# Patient Record
Sex: MALE | Race: WHITE | Employment: FULL TIME
[De-identification: names, ages, dates, MRNs, and addresses within clinical notes are randomized per-mention and may not be internally consistent; named-entity substitution may affect disease eponyms.]

---

## 2021-01-12 ENCOUNTER — NURSE TRIAGE (OUTPATIENT)
Dept: OTHER | Facility: CLINIC | Age: 31
End: 2021-01-12

## 2021-01-12 NOTE — TELEPHONE ENCOUNTER
Reason for Disposition   Symptoms of anxiety or panic and has not been evaluated for this by physician    Answer Assessment - Initial Assessment Questions  1. CONCERN: \"What happened that made you call today? \"      Called off work today related to anxiety. 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 2. ANXIETY SYMPTOM SCREENING: \"Can you describe how you have been feeling? \"  (e.g., tense, restless, panicky, anxious, keyed up, trouble sleeping, trouble concentrating)      Trouble concentrating, Restless, Trouble sleeping    3. ONSET: \"How long have you been feeling this way? \"      Has been anxious for a while but has also been able to manage it. Patient reports x1 week ago he started feeling unable to control it    4. RECURRENT: \"Have you felt this way before? \"  If yes: \"What happened that time? \" \"What helped these feelings go away in the past?\"       Denies being seen by doctor in the past for anxiety. Reports he has always been able self-manage symptoms    5. RISK OF HARM - SUICIDAL IDEATION:  \"Do you ever have thoughts of hurting or killing yourself? \"  (e.g., yes, no, no but preoccupation with thoughts about death)    - INTENT:  \"Do you have thoughts of hurting or killing yourself right NOW? \" (e.g., yes, no, N/A)    - PLAN: \"Do you have a specific plan for how you would do this? \" (e.g., gun, knife, overdose, no plan, N/A)      Denies SI    6. RISK OF HARM - HOMICIDAL IDEATION:  \"Do you ever have thoughts of hurting or killing someone else? \"  (e.g., yes, no, no but preoccupation with thoughts about death)    - INTENT:  \"Do you have thoughts of hurting or killing someone right NOW? \" (e.g., yes, no, N/A)    - PLAN: \"Do you have a specific plan for how you would do this? \" (e.g., gun, knife, no plan, N/A)       Denies HI    7. FUNCTIONAL IMPAIRMENT: \"How have things been going for you overall in your life?  Have you had any more difficulties than usual doing your normal daily activities? \"  (e.g., better, same, worse; self-care, school, work, interactions)      Patient has called out of work due to anxiety. Reports he has had issues eating unhealthy and has been trying to improve diet recently. 8. SUPPORT: \"Who is with you now? \" \"Who do you live with?\" \"Do you have family or friends nearby who you can talk to?\"        Lives with significant other who is his support system        9. THERAPIST: \"Do you have a counselor or therapist? Name? \"     Denies        10. STRESSORS: \"Has there been any new stress or recent changes in your life? \"        Yes, reports constant stress that has worsened recently    11. CAFFEINE ABUSE: \"Do you drink caffeinated beverages, and how much each day? \" (e.g., coffee, tea, rodrick)      Cut out caffeine over 1 month ago to try and help with anxiety           12. SUBSTANCE ABUSE: \"Do you use any illegal drugs or alcohol? \"        Denies any illegal use- Reports he is prescribed adderall and has quit taking it to try  and decrease anxiety    13. OTHER SYMPTOMS: \"Do you have any other physical symptoms right now? \" (e.g., chest pain, palpitations, difficulty breathing, fever)       Denies          14. PREGNANCY: \"Is there any chance you are pregnant? \" \"When was your last menstrual period? \"        n/a    Protocols used: ANXIETY AND PANIC ATTACK-ADULT-[place]    Patient called Antionette with pre-service center [facility] with red flag complaint. Brief description of triage: See above note    Triage indicates for patient to: See disposition    Care advice provided, patient verbalizes understanding; denies any other questions or concerns; instructed to call back for any new or worsening symptoms. Writer provided warm transfer to [facility] for appointment scheduling. Attention Provider: Thank you for allowing me to participate in the care of your patient.   The patient was connected to triage in response to information provided to the E[facility].  Please do not respond through this encounter as the response is not directed to a shared pool.

## 2021-01-13 ENCOUNTER — HOSPITAL ENCOUNTER (OUTPATIENT)
Age: 31
Setting detail: SPECIMEN
Discharge: HOME | End: 2021-01-13
Payer: COMMERCIAL

## 2021-01-13 ENCOUNTER — OFFICE VISIT (OUTPATIENT)
Dept: PRIMARY CARE CLINIC | Age: 31
End: 2021-01-13
Payer: COMMERCIAL

## 2021-01-13 VITALS
HEIGHT: 74 IN | HEART RATE: 91 BPM | WEIGHT: 232.9 LBS | TEMPERATURE: 98.7 F | OXYGEN SATURATION: 96 % | SYSTOLIC BLOOD PRESSURE: 112 MMHG | BODY MASS INDEX: 29.89 KG/M2 | DIASTOLIC BLOOD PRESSURE: 70 MMHG

## 2021-01-13 DIAGNOSIS — F32.1 MODERATE MAJOR DEPRESSION, SINGLE EPISODE (HCC): ICD-10-CM

## 2021-01-13 DIAGNOSIS — F41.9 ANXIETY: ICD-10-CM

## 2021-01-13 DIAGNOSIS — Z13.1 SCREENING FOR DIABETES MELLITUS: ICD-10-CM

## 2021-01-13 DIAGNOSIS — F32.1 MODERATE MAJOR DEPRESSION, SINGLE EPISODE (HCC): Primary | ICD-10-CM

## 2021-01-13 DIAGNOSIS — Z13.220 SCREENING FOR HYPERLIPIDEMIA: ICD-10-CM

## 2021-01-13 DIAGNOSIS — Z13.31 POSITIVE DEPRESSION SCREENING: ICD-10-CM

## 2021-01-13 LAB
ABSOLUTE EOS #: <0.03 K/UL (ref 0–0.44)
ABSOLUTE IMMATURE GRANULOCYTE: <0.03 K/UL (ref 0–0.3)
ABSOLUTE LYMPH #: 2.29 K/UL (ref 1.1–3.7)
ABSOLUTE MONO #: 0.64 K/UL (ref 0.1–1.2)
ALBUMIN SERPL-MCNC: 4.6 G/DL (ref 3.5–5.2)
ALBUMIN/GLOBULIN RATIO: 1.6 (ref 1–2.5)
ALP BLD-CCNC: 64 U/L (ref 40–129)
ALT SERPL-CCNC: 13 U/L (ref 5–41)
ANION GAP SERPL CALCULATED.3IONS-SCNC: 12 MMOL/L (ref 9–17)
AST SERPL-CCNC: 15 U/L
BASOPHILS # BLD: 1 % (ref 0–2)
BASOPHILS ABSOLUTE: 0.04 K/UL (ref 0–0.2)
BILIRUB SERPL-MCNC: 0.38 MG/DL (ref 0.3–1.2)
BUN BLDV-MCNC: 15 MG/DL (ref 6–20)
BUN/CREAT BLD: ABNORMAL (ref 9–20)
CALCIUM SERPL-MCNC: 9.6 MG/DL (ref 8.6–10.4)
CHLORIDE BLD-SCNC: 101 MMOL/L (ref 98–107)
CHOLESTEROL/HDL RATIO: 3.6
CHOLESTEROL: 160 MG/DL
CO2: 27 MMOL/L (ref 20–31)
CREAT SERPL-MCNC: 0.95 MG/DL (ref 0.7–1.2)
DIFFERENTIAL TYPE: ABNORMAL
EOSINOPHILS RELATIVE PERCENT: 0 % (ref 1–4)
GFR AFRICAN AMERICAN: >60 ML/MIN
GFR NON-AFRICAN AMERICAN: >60 ML/MIN
GFR SERPL CREATININE-BSD FRML MDRD: ABNORMAL ML/MIN/{1.73_M2}
GFR SERPL CREATININE-BSD FRML MDRD: ABNORMAL ML/MIN/{1.73_M2}
GLUCOSE FASTING: 101 MG/DL (ref 70–99)
HCT VFR BLD CALC: 50.3 % (ref 40.7–50.3)
HDLC SERPL-MCNC: 44 MG/DL
HEMOGLOBIN: 16.3 G/DL (ref 13–17)
IMMATURE GRANULOCYTES: 0 %
LDL CHOLESTEROL: 98 MG/DL (ref 0–130)
LYMPHOCYTES # BLD: 35 % (ref 24–43)
MCH RBC QN AUTO: 28.9 PG (ref 25.2–33.5)
MCHC RBC AUTO-ENTMCNC: 32.4 G/DL (ref 28.4–34.8)
MCV RBC AUTO: 89.2 FL (ref 82.6–102.9)
MONOCYTES # BLD: 10 % (ref 3–12)
NRBC AUTOMATED: 0 PER 100 WBC
PDW BLD-RTO: 13 % (ref 11.8–14.4)
PLATELET # BLD: 323 K/UL (ref 138–453)
PLATELET ESTIMATE: ABNORMAL
PMV BLD AUTO: 9.9 FL (ref 8.1–13.5)
POTASSIUM SERPL-SCNC: 4.2 MMOL/L (ref 3.7–5.3)
RBC # BLD: 5.64 M/UL (ref 4.21–5.77)
RBC # BLD: ABNORMAL 10*6/UL
SEG NEUTROPHILS: 54 % (ref 36–65)
SEGMENTED NEUTROPHILS ABSOLUTE COUNT: 3.47 K/UL (ref 1.5–8.1)
SODIUM BLD-SCNC: 140 MMOL/L (ref 135–144)
TOTAL PROTEIN: 7.5 G/DL (ref 6.4–8.3)
TRIGL SERPL-MCNC: 89 MG/DL
TSH SERPL DL<=0.05 MIU/L-ACNC: 3.76 MIU/L (ref 0.3–5)
VITAMIN D 25-HYDROXY: 15.8 NG/ML (ref 30–100)
VLDLC SERPL CALC-MCNC: NORMAL MG/DL (ref 1–30)
WBC # BLD: 6.5 K/UL (ref 3.5–11.3)
WBC # BLD: ABNORMAL 10*3/UL

## 2021-01-13 PROCEDURE — 99214 OFFICE O/P EST MOD 30 MIN: CPT | Performed by: NURSE PRACTITIONER

## 2021-01-13 PROCEDURE — G8431 POS CLIN DEPRES SCRN F/U DOC: HCPCS | Performed by: NURSE PRACTITIONER

## 2021-01-13 RX ORDER — BUPROPION HYDROCHLORIDE 150 MG/1
150 TABLET ORAL EVERY MORNING
Qty: 30 TABLET | Refills: 0 | Status: SHIPPED | OUTPATIENT
Start: 2021-01-13 | End: 2021-02-03

## 2021-01-13 RX ORDER — HYDROXYZINE HYDROCHLORIDE 25 MG/1
25 TABLET, FILM COATED ORAL EVERY 8 HOURS PRN
Qty: 30 TABLET | Refills: 0 | Status: SHIPPED
Start: 2021-01-13 | End: 2021-01-20 | Stop reason: SINTOL

## 2021-01-13 ASSESSMENT — PATIENT HEALTH QUESTIONNAIRE - PHQ9
4. FEELING TIRED OR HAVING LITTLE ENERGY: 0
5. POOR APPETITE OR OVEREATING: 2
SUM OF ALL RESPONSES TO PHQ9 QUESTIONS 1 & 2: 6
1. LITTLE INTEREST OR PLEASURE IN DOING THINGS: 3
2. FEELING DOWN, DEPRESSED OR HOPELESS: 3
SUM OF ALL RESPONSES TO PHQ QUESTIONS 1-9: 14

## 2021-01-13 ASSESSMENT — COLUMBIA-SUICIDE SEVERITY RATING SCALE - C-SSRS: 6. HAVE YOU EVER DONE ANYTHING, STARTED TO DO ANYTHING, OR PREPARED TO DO ANYTHING TO END YOUR LIFE?: NO

## 2021-01-13 ASSESSMENT — ENCOUNTER SYMPTOMS
ALLERGIC/IMMUNOLOGIC NEGATIVE: 1
GASTROINTESTINAL NEGATIVE: 1
EYES NEGATIVE: 1
RESPIRATORY NEGATIVE: 1

## 2021-01-13 NOTE — LETTER
[address] [facility]  [address]  [place] [address]  Phone: [phone]  Fax: [phone] [facility], APRN - [place]        January 13, 2021     Patient: Frankie Ying   YOB: 1990   Date of Visit: 1/13/2021       To Whom It May Concern: It is my medical opinion that Timur Landin should remain off work from 1/11/2021 through 1/20/2021. If you have any questions or concerns, please don't hesitate to call.     Sincerely,        Robles Taylor, FLAKO - CNP

## 2021-01-13 NOTE — PATIENT INSTRUCTIONS
Patient Education        Learning About Anxiety Disorders  What are anxiety disorders? Anxiety disorders are a type of medical problem. They cause severe anxiety. When you feel anxious, you feel that something bad is about to happen. This feeling interferes with your life. These disorders include:  · Generalized anxiety disorder. You feel worried and stressed about many everyday events and activities. This goes on for several months and disrupts your life on most days. · Panic disorder. You have repeated panic attacks. A panic attack is a sudden, intense fear or anxiety. It may make you feel short of breath. Your heart may pound. · Social anxiety disorder. You feel very anxious about what you will say or do in front of people. For example, you may be scared to talk or eat in public. This problem affects your daily life. · Phobias. You are very scared of a specific object, situation, or activity. For example, you may fear spiders, high places, or small spaces. What are the symptoms? Generalized anxiety disorder  Symptoms may include:  · Feeling worried and stressed about many things almost every day. · Feeling tired or irritable. You may have a hard time concentrating. · Having headaches or muscle aches. · Having a hard time getting to sleep or staying asleep. Panic disorder  You may have repeated panic attacks when there is no reason for feeling afraid. You may change your daily activities because you worry that you will have another attack. Symptoms may include:  · Intense fear, terror, or anxiety. · Trouble breathing or very fast breathing. · Chest pain or tightness. · A heartbeat that races or is not regular. Social anxiety disorder  Symptoms may include:  · Fear about a social situation, such as eating in front of others or speaking in public. You may worry a lot. Or you may be afraid that something bad will happen. · Anxiety that can cause you to blush, sweat, and feel shaky.   · A heartbeat that is faster than normal.  · A hard time focusing. Phobias  Symptoms may include:  · More fear than most people of being around an object, being in a situation, or doing an activity. You might also be stressed about the chance of being around the thing you fear. · Worry about losing control, panicking, fainting, or having physical symptoms like a faster heartbeat when you are around the situation or object. How are these disorders treated? Anxiety disorders can be treated with medicines or counseling. A combination of both may be used. Medicines may include:  · Antidepressants. These may help your symptoms by keeping chemicals in your brain in balance. · Benzodiazepines. These may give you short-term relief of your symptoms. Some people use cognitive-behavioral therapy. A therapist helps you learn to change stressful or bad thoughts into helpful thoughts. Lead a healthy lifestyle  A healthy lifestyle may help you feel better. · Get at least 30 minutes of exercise on most days of the week. Walking is a good choice. · Eat a healthy diet. Include fruits, vegetables, lean proteins, and whole grains in your diet each day. · Try to go to bed at the same time every night. Try for 8 hours of sleep a night. · Find ways to manage stress. Try relaxation exercises. · Avoid alcohol and illegal drugs. Follow-up care is a key part of your treatment and safety. Be sure to make and go to all appointments, and call your doctor if you are having problems. It's also a good idea to know your test results and keep a list of the medicines you take. Where can you learn more? Go to https://geronimo.Fidelis. org and sign in to your [employer] account. Enter S273 in the [facility] box to learn more about \"Learning About Anxiety Disorders. \"     If you do not have an account, please click on the \"Sign Up Now\" link.   Current as of: January 31, 2020               Content Version: 12.6  © 7120-5037 Healthwise, Incorporated. Care instructions adapted under license by [facility] ([facility]). If you have questions about a medical condition or this instruction, always ask your healthcare professional. [address] any warranty or liability for your use of this information. Patient Education        Depression Treatment: Care Instructions  Your Care Instructions     Depression is a condition that affects the way you feel, think, and act. It causes symptoms such as low energy, loss of interest in daily activities, and sadness or grouchiness that goes on for a long time. Depression is very common and affects men and women of all ages. Depression is a medical illness caused by changes in the natural chemicals in your brain. It is not a character flaw, and it does not mean that you are a bad or weak person. It does not mean that you are going crazy. It is important to know that depression can be treated. Medicines, counseling, and self-care can all help. Many people do not get help because they are embarrassed or think that they will get over the depression on their own. But some people do not get better without treatment. Follow-up care is a key part of your treatment and safety. Be sure to make and go to all appointments, and call your doctor if you are having problems. It's also a good idea to know your test results and keep a list of the medicines you take. How can you care for yourself at home? Learn about antidepressant medicines  Antidepressant medicines can improve or end the symptoms of depression. You may need to take the medicine for at least 6 months, and often longer. Keep taking your medicine even if you feel better. If you stop taking it too soon, your symptoms may come back or get worse. You may start to feel better within 1 to 3 weeks of taking antidepressant medicine. But it can take as many as 6 to 8 weeks to see more improvement.  Talk to your doctor if you have problems with your medicine or if you do not notice any improvement after 3 weeks. Antidepressants can make you feel tired, dizzy, or nervous. Some people have dry mouth, constipation, headaches, sexual problems, an upset stomach, or diarrhea. Many of these side effects are mild and go away on their own after you take the medicine for a few weeks. Some may last longer. Talk to your doctor if side effects bother you too much. You might be able to try a different medicine. If you are pregnant or breastfeeding, talk to your doctor about what medicines you can take. Learn about counseling  In many cases, counseling can work as well as medicines to treat mild to moderate depression. Counseling is done by licensed mental health providers, such as psychologists, social workers, and some types of nurses. It can be done in one-on-one sessions or in a group setting. Many people find group sessions helpful. Cognitive-behavioral therapy is a type of counseling. In this treatment therapy, you learn how to see and change unhelpful thinking styles that may be adding to your depression. Counseling and medicines often work well when used together. Here are other things you could try to help with depression:  · Get regular exercise. It may help you feel better. · Plan something pleasant for yourself every day. Include activities that you have enjoyed in the past.  · Get enough sleep. Talk to your doctor if you have problems sleeping. · Eat a balanced diet. If you do not feel hungry, eat small snacks rather than large meals. · Avoid using illegal drugs or marijuana and drinking alcohol. Do not take medicines that have not been prescribed for you. They may interfere with your treatment, or they may make your depression worse. · Spend time with family and friends. It may help to speak openly about your depression with people you trust.  · Take your medicines exactly as prescribed.  Call your doctor if you think you are having a problem with your medicine. · Do not make major life decisions while you are depressed. Depression may change the way you think. You will be able to make better decisions after you feel better. · Think positively. Challenge negative thoughts with statements such as \"I am hopeful\"; \"Things will get better\"; and \"I can ask for the help I need. \" Write down these statements and read them often, even if you don't believe them yet. · Be patient with yourself. It took time for your depression to develop, and it will take time for your symptoms to improve. Do not take on too much or be too hard on yourself. · Learn all you can about depression from written and online materials. · Check out behavioral health classes to learn more about dealing with depression. · If you or someone you know talks about suicide, self-harm, or feeling hopeless, get help right away. Call the [address] [place] [address] at 9-557-083-HSRS (2-360.225.7477) or text HOME to 171407 to access the [email or web address] Text Line. Consider saving these numbers in your phone. When should you call for help? Call 911 anytime you think you may need emergency care. For example, call if:    · You feel you cannot stop from hurting yourself or someone else. Call your doctor now or seek immediate medical care if:    · You hear voices.     · You feel much more depressed. Watch closely for changes in your health, and be sure to contact your doctor if:    · You are having problems with your depression medicine.     · You are not getting better as expected. Where can you learn more? Go to https://HitFixopal.Video Passports. org and sign in to your [employer] account. Enter M341 in the [facility] box to learn more about \"Depression Treatment: Care Instructions. \"     If you do not have an account, please click on the \"Sign Up Now\" link. Current as of: January 31, 2020               Content Version: 12.6  © 8245-9243 [employer], [facility].    Care instructions adapted under license by [facility] ([facility]). If you have questions about a medical condition or this instruction, always ask your healthcare professional. [address] any warranty or liability for your use of this information.

## 2021-01-17 ENCOUNTER — PATIENT MESSAGE (OUTPATIENT)
Dept: PRIMARY CARE CLINIC | Age: 31
End: 2021-01-17

## 2021-01-18 NOTE — TELEPHONE ENCOUNTER
From: Jasen Chaudhry Lee  To: FLAKO Powers - CNP  Sent: 1/17/2021 3:09 PM EST  Subject: Non-Urgent Medical Question    Hey its Vedia Pastswati, I was just curious if you got around to filling out the fmla paper work that was sent to the office. [employer] the company said they sent it Thursday.  Just checking in with that, thank you

## 2021-01-20 ENCOUNTER — OFFICE VISIT (OUTPATIENT)
Dept: PRIMARY CARE CLINIC | Age: 31
End: 2021-01-20
Payer: COMMERCIAL

## 2021-01-20 VITALS
DIASTOLIC BLOOD PRESSURE: 80 MMHG | HEART RATE: 100 BPM | SYSTOLIC BLOOD PRESSURE: 128 MMHG | OXYGEN SATURATION: 96 % | HEIGHT: 74 IN | WEIGHT: 230 LBS | TEMPERATURE: 98.3 F | BODY MASS INDEX: 29.52 KG/M2

## 2021-01-20 DIAGNOSIS — F51.01 PRIMARY INSOMNIA: ICD-10-CM

## 2021-01-20 DIAGNOSIS — F32.1 MODERATE MAJOR DEPRESSION, SINGLE EPISODE (HCC): Primary | ICD-10-CM

## 2021-01-20 DIAGNOSIS — F41.9 ANXIETY: ICD-10-CM

## 2021-01-20 DIAGNOSIS — E55.9 HYPOVITAMINOSIS D: ICD-10-CM

## 2021-01-20 PROCEDURE — 99214 OFFICE O/P EST MOD 30 MIN: CPT | Performed by: NURSE PRACTITIONER

## 2021-01-20 RX ORDER — TRAZODONE HYDROCHLORIDE 50 MG/1
50 TABLET ORAL NIGHTLY PRN
Qty: 30 TABLET | Refills: 0 | Status: SHIPPED | OUTPATIENT
Start: 2021-01-20 | End: 2021-01-26

## 2021-01-20 ASSESSMENT — ENCOUNTER SYMPTOMS
EYES NEGATIVE: 1
RESPIRATORY NEGATIVE: 1
ALLERGIC/IMMUNOLOGIC NEGATIVE: 1
GASTROINTESTINAL NEGATIVE: 1

## 2021-01-20 NOTE — PROGRESS NOTES
Quit date: 2016     Years since quittin.9    Smokeless tobacco: Former User   Substance Use Topics    Alcohol use: No    Drug use: Never      Past Medical History:   Diagnosis Date    ADD (attention deficit disorder) 9/15/2016    Anxiety       No data recorded   Immunization:  Immunization History   Administered Date(s) Administered    Tdap (Boostrix, Adacel) 2008    Varicella (Varivax) 1990, 1993     Past Surgical History:   Procedure Laterality Date    FRACTURE SURGERY Right     age 7     Family History   Problem Relation Age of Onset    Diabetes Mother     Cancer Sister 28        neck and throat cancer     Current Outpatient Medications   Medication Sig Dispense Refill    traZODone (DESYREL) 50 MG tablet Take 1 tablet by mouth nightly as needed for Sleep 30 tablet 0    buPROPion (WELLBUTRIN XL) 150 MG extended release tablet Take 1 tablet by mouth every morning 30 tablet 0     No current facility-administered medications for this visit. The patient's past medical, surgical, social, and family history as well as his current medications and allergies were reviewed as documented in today's encounter. Review of Systems   Constitutional: Positive for fatigue. HENT: Negative. Eyes: Negative. Respiratory: Negative. Cardiovascular: Negative. Gastrointestinal: Negative. Endocrine: Negative. Genitourinary: Negative. Musculoskeletal: Negative. Skin: Negative. Allergic/Immunologic: Negative. Neurological: Positive for dizziness. Hematological: Negative. Psychiatric/Behavioral: Positive for decreased concentration and sleep disturbance. The patient is nervous/anxious. All other systems reviewed and are negative. /80   Pulse 100   Temp 98.3 °F (36.8 °C) (Temporal)   Ht 6' 2\" (1.88 m)   Wt 230 lb (104.3 kg)   SpO2 96%   BMI 29.53 kg/m²   Physical Exam  Vitals signs and nursing note reviewed.    Constitutional: Appearance: Normal appearance. He is normal weight. HENT:      Right Ear: External ear normal.      Left Ear: External ear normal.      Nose: Nose normal.      Mouth/Throat:      Mouth: Mucous membranes are moist.      Pharynx: Oropharynx is clear. Eyes:      Extraocular Movements: Extraocular movements intact. Conjunctiva/sclera: Conjunctivae normal.      Pupils: Pupils are equal, round, and reactive to light. Neck:      Musculoskeletal: Normal range of motion. Cardiovascular:      Rate and Rhythm: Normal rate and regular rhythm. Pulses: Normal pulses. Heart sounds: Normal heart sounds. Pulmonary:      Effort: Pulmonary effort is normal.      Breath sounds: Normal breath sounds. Abdominal:      General: Abdomen is flat. Bowel sounds are normal.      Palpations: Abdomen is soft. Skin:     General: Skin is warm and dry. Capillary Refill: Capillary refill takes less than 2 seconds. Neurological:      General: No focal deficit present. Mental Status: He is alert. Psychiatric:         Attention and Perception: Attention normal.         Mood and Affect: Mood is anxious and depressed. Affect is tearful. Speech: Speech normal.         Behavior: Behavior is cooperative. Thought Content: Thought content is not paranoid or delusional. Thought content does not include homicidal or suicidal ideation. Thought content does not include homicidal or suicidal plan.          Cognition and Memory: Cognition and memory normal.         Judgment: Judgment normal.       Lab Results   Component Value Date    WBC 6.5 01/13/2021    HGB 16.3 01/13/2021    HCT 50.3 01/13/2021    MCV 89.2 01/13/2021     01/13/2021     Lab Results   Component Value Date     01/13/2021    K 4.2 01/13/2021     01/13/2021    CO2 27 01/13/2021    BUN 15 01/13/2021    CREATININE 0.95 01/13/2021    GLUCOSE 108 02/22/2014    CALCIUM 9.6 01/13/2021      Lab Results   Component Value Date    ALT 13 01/13/2021    AST 15 01/13/2021    ALKPHOS 64 01/13/2021    BILITOT 0.38 01/13/2021     Lab Results   Component Value Date    TSH 3.76 01/13/2021     Lab Results   Component Value Date    CHOL 160 01/13/2021     Lab Results   Component Value Date    TRIG 89 01/13/2021     Lab Results   Component Value Date    HDL 44 01/13/2021     Lab Results   Component Value Date    LDLCHOLESTEROL 98 01/13/2021     Lab Results   Component Value Date    CHOLHDLRATIO 3.6 01/13/2021     No results found for: LABA1C  No results found for: GESHIKKM21  No results found for: FOLATE  No results found for: IRON, TIBC, FERRITIN  Lab Results   Component Value Date    VITD25 15.8 (L) 01/13/2021     I personally reviewed testing with patient. Otherwise labs within normal limits  ASSESSMENT AND PLAN  1. Moderate major depression, single episode ([address]  Continue wellbutrin and f/u in one week. 2. Anxiety  D/c hydroxyzine due to side effects. 3. Primary insomnia    - traZODone (DESYREL) 50 MG tablet; Take 1 tablet by mouth nightly as needed for Sleep  Dispense: 30 tablet; Refill: 0     No orders of the defined types were placed in this encounter. Orders Placed This Encounter   Medications    traZODone (DESYREL) 50 MG tablet     Sig: Take 1 tablet by mouth nightly as needed for Sleep     Dispense:  30 tablet     Refill:  0      Data Unavailable  There are no preventive care reminders to display for this patient. Medications Discontinued During This Encounter   Medication Reason    hydrOXYzine (ATARAX) 25 MG tablet Side effects     The patient's past medical, surgical, social, and family history as well as his   current medications and allergies were reviewed as documented in today's encounter. Medications, labs, diagnostic studies, consultations and follow-up as documented in this encounter. Return in about 1 week (around 1/27/2021).   Future Appointments   Date Time Provider David Gupta   1/27/2021 11:30 AM Jeannie Delong

## 2021-01-26 DIAGNOSIS — F51.01 PRIMARY INSOMNIA: Primary | Chronic | ICD-10-CM

## 2021-01-26 RX ORDER — ZOLPIDEM TARTRATE 10 MG/1
10 TABLET ORAL NIGHTLY PRN
Qty: 14 TABLET | Refills: 0 | Status: SHIPPED | OUTPATIENT
Start: 2021-01-26 | End: 2021-02-09

## 2021-01-26 NOTE — PROGRESS NOTES
Spoke with pt through jenny, trazodone not helping and pt still not able to sleep at night. Told pt to d/c trazodone and will send ambien to try, pt has f.u appt tomorrow, pt understands and agreeable.

## 2021-01-27 ENCOUNTER — OFFICE VISIT (OUTPATIENT)
Dept: PRIMARY CARE CLINIC | Age: 31
End: 2021-01-27
Payer: COMMERCIAL

## 2021-01-27 VITALS
HEART RATE: 92 BPM | OXYGEN SATURATION: 96 % | SYSTOLIC BLOOD PRESSURE: 132 MMHG | BODY MASS INDEX: 29.54 KG/M2 | WEIGHT: 230.2 LBS | HEIGHT: 74 IN | TEMPERATURE: 97.9 F | DIASTOLIC BLOOD PRESSURE: 80 MMHG

## 2021-01-27 DIAGNOSIS — F32.1 MODERATE MAJOR DEPRESSION, SINGLE EPISODE (HCC): Primary | Chronic | ICD-10-CM

## 2021-01-27 DIAGNOSIS — F51.01 PRIMARY INSOMNIA: Chronic | ICD-10-CM

## 2021-01-27 DIAGNOSIS — F41.9 ANXIETY: Chronic | ICD-10-CM

## 2021-01-27 PROCEDURE — 99214 OFFICE O/P EST MOD 30 MIN: CPT | Performed by: NURSE PRACTITIONER

## 2021-01-27 RX ORDER — FLUOXETINE HYDROCHLORIDE 20 MG/1
20 CAPSULE ORAL DAILY
Qty: 30 CAPSULE | Refills: 3 | Status: SHIPPED | OUTPATIENT
Start: 2021-01-27 | End: 2021-02-03

## 2021-01-27 ASSESSMENT — ENCOUNTER SYMPTOMS
EYES NEGATIVE: 1
ALLERGIC/IMMUNOLOGIC NEGATIVE: 1
GASTROINTESTINAL NEGATIVE: 1
RESPIRATORY NEGATIVE: 1

## 2021-01-27 NOTE — PROGRESS NOTES
[facility] MEDICINE  [address] [place] [id number]  Dept: [phone]       Yogesh Arora is a 31 y.o. male who presents to the office today for evaluation of Depression, Medication Check, and Insomnia    Pt here for medication check, he hasn't picked up ambien yet, will try tonight. Trazodone not working for sleep. Anxiety/depression has improved but not by a lot, discussed switching from wellbutrin to prozac instead. Told pt to start taking wellbutrin every other day for 7 days, can start prozac today. Will see how he does with ambien and prozac and f/u in one week. Pt understands and agreeable to plan, will call or message me before then if any issues. Patient Active Problem List    Diagnosis Date Noted    Primary insomnia 2021    Hypovitaminosis D 2021    [facility], [facility]    Anxiety 2021    ADD (attention deficit disorder) 09/15/2016     Patient's BMI is Body mass index is 29.56 kg/m².    Social History     Tobacco Use    Smoking status: Former Smoker     Packs/day: 0.25     Years: 2.00     Pack years: 0.50     Quit date: 2016     Years since quittin.9    Smokeless tobacco: Former User   Substance Use Topics    Alcohol use: No    Drug use: Never      Past Medical History:   Diagnosis Date    ADD (attention deficit disorder) 9/15/2016    Anxiety       No data recorded   Immunization:  Immunization History   Administered Date(s) Administered    Tdap (Boostrix, Adacel) 2008    Varicella (Varivax) 1990, 1993     Past Surgical History:   Procedure Laterality Date    FRACTURE SURGERY Right     age 7     Family History   Problem Relation Age of Onset    Diabetes Mother     Cancer Sister 28        neck and throat cancer     Current Outpatient Medications   Medication Sig Dispense Refill    FLUoxetine (PROZAC) 20 MG capsule Take 1 capsule by mouth daily 30 capsule 3  zolpidem (AMBIEN) 10 MG tablet Take 1 tablet by mouth nightly as needed for Sleep for up to 14 days. 14 tablet 0    buPROPion (WELLBUTRIN XL) 150 MG extended release tablet Take 1 tablet by mouth every morning 30 tablet 0     No current facility-administered medications for this visit. The patient's past medical, surgical, social, and family history as well as his current medications and allergies were reviewed as documented in today's encounter. Review of Systems   Constitutional: Positive for fatigue. HENT: Negative. Eyes: Negative. Respiratory: Negative. Cardiovascular: Negative. Gastrointestinal: Negative. Endocrine: Negative. Genitourinary: Negative. Musculoskeletal: Negative. Skin: Negative. Allergic/Immunologic: Negative. Neurological: Negative. Hematological: Negative. Psychiatric/Behavioral: Positive for sleep disturbance. Negative for suicidal ideas. The patient is nervous/anxious and is hyperactive. All other systems reviewed and are negative. /80 (Site: Left Upper Arm, Position: Sitting, Cuff Size: Medium Adult)   Pulse 92   Temp 97.9 °F (36.6 °C) (Temporal)   Ht 6' 2\" (1.88 m)   Wt 230 lb 3.2 oz (104.4 kg)   SpO2 96%   BMI 29.56 kg/m²   Physical Exam  Vitals signs and nursing note reviewed. Constitutional:       Appearance: Normal appearance. He is normal weight. HENT:      Right Ear: Tympanic membrane, ear canal and external ear normal.      Left Ear: Tympanic membrane, ear canal and external ear normal.      Nose: Nose normal.      Mouth/Throat:      Mouth: Mucous membranes are moist.      Pharynx: Oropharynx is clear. Eyes:      Extraocular Movements: Extraocular movements intact. Conjunctiva/sclera: Conjunctivae normal.      Pupils: Pupils are equal, round, and reactive to light. Neck:      Musculoskeletal: Normal range of motion. Cardiovascular:      Rate and Rhythm: Normal rate and regular rhythm.       Pulses: Normal pulses. Heart sounds: Normal heart sounds. Pulmonary:      Effort: Pulmonary effort is normal.      Breath sounds: Normal breath sounds. Abdominal:      General: Abdomen is flat. Bowel sounds are normal.      Palpations: Abdomen is soft. Skin:     General: Skin is warm and dry. Capillary Refill: Capillary refill takes less than 2 seconds. Neurological:      General: No focal deficit present. Mental Status: He is alert. Psychiatric:         Mood and Affect: Mood is anxious. Behavior: Behavior normal.         Thought Content: Thought content normal.         Judgment: Judgment normal.       Lab Results   Component Value Date    WBC 6.5 01/13/2021    HGB 16.3 01/13/2021    HCT 50.3 01/13/2021    MCV 89.2 01/13/2021     01/13/2021     Lab Results   Component Value Date     01/13/2021    K 4.2 01/13/2021     01/13/2021    CO2 27 01/13/2021    BUN 15 01/13/2021    CREATININE 0.95 01/13/2021    GLUCOSE 108 02/22/2014    CALCIUM 9.6 01/13/2021      Lab Results   Component Value Date    ALT 13 01/13/2021    AST 15 01/13/2021    ALKPHOS 64 01/13/2021    BILITOT 0.38 01/13/2021     Lab Results   Component Value Date    TSH 3.76 01/13/2021     Lab Results   Component Value Date    CHOL 160 01/13/2021     Lab Results   Component Value Date    TRIG 89 01/13/2021     Lab Results   Component Value Date    HDL 44 01/13/2021     Lab Results   Component Value Date    LDLCHOLESTEROL 98 01/13/2021     Lab Results   Component Value Date    CHOLHDLRATIO 3.6 01/13/2021     No results found for: LABA1C  No results found for: [id number]  No results found for: FOLATE  No results found for: IRON, TIBC, FERRITIN  Lab Results   Component Value Date    VITD25 15.8 (L) 01/13/2021     I personally reviewed testing with patient. Otherwise labs within normal limits  ASSESSMENT AND PLAN  1. Moderate major depression, single episode (HCC)    - FLUoxetine (PROZAC) 20 MG capsule;  Take 1 capsule by mouth daily  Dispense: 30 capsule; Refill: 3    2. Anxiety    - FLUoxetine (PROZAC) 20 MG capsule; Take 1 capsule by mouth daily  Dispense: 30 capsule; Refill: 3    3. Primary insomnia       No orders of the defined types were placed in this encounter. Orders Placed This Encounter   Medications    FLUoxetine (PROZAC) 20 MG capsule     Sig: Take 1 capsule by mouth daily     Dispense:  30 capsule     Refill:  3      Data Unavailable  There are no preventive care reminders to display for this patient. There are no discontinued medications. The patient's past medical, surgical, social, and family history as well as his   current medications and allergies were reviewed as documented in today's encounter. Medications, labs, diagnostic studies, consultations and follow-up as documented in this encounter. Return in about 1 week (around 2/3/2021). Future Appointments   Date Time Provider David Gupta   2/3/2021 11:30 AM FLAKO Ewing CNP     This note was completed by using the assistance of a speech-recognition program. However, inadvertent computerized transcription errors may be present. Although every effort was made to ensure accuracy, no guarantees can be provided that every mistake has been identified and corrected by editing.   Electronically signed by FLAKO Osorio CNP on 1/27/2021 at 12:14 PM

## 2021-02-03 ENCOUNTER — OFFICE VISIT (OUTPATIENT)
Dept: PRIMARY CARE CLINIC | Age: 31
End: 2021-02-03
Payer: COMMERCIAL

## 2021-02-03 VITALS
DIASTOLIC BLOOD PRESSURE: 70 MMHG | TEMPERATURE: 98 F | SYSTOLIC BLOOD PRESSURE: 120 MMHG | BODY MASS INDEX: 29.52 KG/M2 | OXYGEN SATURATION: 98 % | HEART RATE: 90 BPM | WEIGHT: 230 LBS | HEIGHT: 74 IN

## 2021-02-03 DIAGNOSIS — F41.9 ANXIETY: Chronic | ICD-10-CM

## 2021-02-03 DIAGNOSIS — F32.1 MODERATE MAJOR DEPRESSION, SINGLE EPISODE (HCC): Primary | Chronic | ICD-10-CM

## 2021-02-03 DIAGNOSIS — F90.2 ATTENTION DEFICIT HYPERACTIVITY DISORDER (ADHD), COMBINED TYPE: Chronic | ICD-10-CM

## 2021-02-03 PROCEDURE — 99214 OFFICE O/P EST MOD 30 MIN: CPT | Performed by: NURSE PRACTITIONER

## 2021-02-03 RX ORDER — TRAZODONE HYDROCHLORIDE 50 MG/1
50 TABLET ORAL NIGHTLY
COMMUNITY
End: 2021-06-08

## 2021-02-03 RX ORDER — VENLAFAXINE HYDROCHLORIDE 37.5 MG/1
37.5 CAPSULE, EXTENDED RELEASE ORAL DAILY
Qty: 30 CAPSULE | Refills: 0 | Status: SHIPPED
Start: 2021-02-03 | End: 2021-02-10 | Stop reason: SINTOL

## 2021-02-03 ASSESSMENT — ENCOUNTER SYMPTOMS
EYES NEGATIVE: 1
RESPIRATORY NEGATIVE: 1
GASTROINTESTINAL NEGATIVE: 1
ALLERGIC/IMMUNOLOGIC NEGATIVE: 1

## 2021-02-03 NOTE — LETTER
[address] [facility]  [address]  [place] [address]  Phone: [phone]  Fax: [address], [place] - [place]        February 3, 2021     Patient: Edna Thomas   YOB: 1990   Date of Visit: 2/3/2021       To Whom It May Concern: It is my medical opinion that Delmer Brian should remain out of work until from 2/3/2021 through 2/10/2021. If you have any questions or concerns, please don't hesitate to call.     Sincerely,        FLAKO Batista - CNP

## 2021-02-03 NOTE — PROGRESS NOTES
[facility]  MEDICINE  [address] [place] [id number]  Dept: [phone]       Roney Tipton is a 31 y.o. male who presents to the office today for evaluation of Anxiety (states anxiety has improved ), Depression (taking prozac for two days and had GI upset and nausea. stopped thr prozax after two days, weaned off of wellbutrin, feels a lot of brain fog, a lot of difficulty with focus and not wanting to get out of bed. ), and Insomnia (ambien unsuccessful on its own, tried trazadone as directed and felt groggy and slow the following days althugh he was sleeping with both agents )    Pt is here for f/u and med check. Pt started prozac but stopped after 2 days due to having GI side effects. He also d/c'd wellbutrin because he didn't feel it was helping. He is sleeping better and takes [place] as needed and trazodone nightly. Pt says his anxiety and depression has improved but he is still having issues with brain fog, concentration and not feeling motivated to be productive. Pt mentioned wanting to try effexor, will start low dose and have f/u in one week to reassess. Pt is still off work and at this time I don't feel he is ready to return until we get his mental state more stabilized, pt agrees and understands. Patient Active Problem List    Diagnosis Date Noted    Primary insomnia 2021    Hypovitaminosis D 2021    Moderate major depression, single episode ([facility] 2021    Anxiety 2021    ADD (attention deficit disorder) 09/15/2016     Patient's BMI is Body mass index is 29.53 kg/m².    Social History     Tobacco Use    Smoking status: Former Smoker     Packs/day: 0.25     Years: 2.00     Pack years: 0.50     Quit date: 2016     Years since quittin.9    Smokeless tobacco: Former User   Substance Use Topics    Alcohol use: No    Drug use: Never      Past Medical History:   Diagnosis Date    ADD (attention deficit disorder) 9/15/2016  Anxiety       No data recorded   Immunization:  Immunization History   Administered Date(s) Administered    Tdap (Boostrix, Adacel) 06/27/2008    Varicella (Varivax) 06/27/1990, 06/27/1993     Past Surgical History:   Procedure Laterality Date    FRACTURE SURGERY Right     age 7     Family History   Problem Relation Age of Onset    Diabetes Mother     Cancer Sister 28        neck and throat cancer     Current Outpatient Medications   Medication Sig Dispense Refill    traZODone (DESYREL) 50 MG tablet Take 50 mg by mouth nightly      venlafaxine (EFFEXOR XR) 37.5 MG extended release capsule Take 1 capsule by mouth daily 30 capsule 0    zolpidem (AMBIEN) 10 MG tablet Take 1 tablet by mouth nightly as needed for Sleep for up to 14 days. 14 tablet 0     No current facility-administered medications for this visit. The patient's past medical, surgical, social, and family history as well as his current medications and allergies were reviewed as documented in today's encounter. Review of Systems   Constitutional: Negative. HENT: Negative. Eyes: Negative. Respiratory: Negative. Cardiovascular: Negative. Gastrointestinal: Negative. Endocrine: Negative. Genitourinary: Negative. Musculoskeletal: Negative. Skin: Negative. Allergic/Immunologic: Negative. Neurological: Negative. Hematological: Negative. Psychiatric/Behavioral: Positive for decreased concentration and sleep disturbance. All other systems reviewed and are negative. /70   Pulse 90   Temp 98 °F (36.7 °C)   Ht 6' 2\" (1.88 m)   Wt 230 lb (104.3 kg)   SpO2 98%   BMI 29.53 kg/m²   Physical Exam  Vitals signs and nursing note reviewed. Constitutional:       Appearance: Normal appearance. He is normal weight. HENT:      Nose: Nose normal.      Mouth/Throat:      Mouth: Mucous membranes are moist.      Pharynx: Oropharynx is clear.    Eyes:      Extraocular Movements: Extraocular movements intact. Conjunctiva/sclera: Conjunctivae normal.      Pupils: Pupils are equal, round, and reactive to light. Neck:      Musculoskeletal: Normal range of motion. Cardiovascular:      Rate and Rhythm: Normal rate and regular rhythm. Pulses: Normal pulses. Heart sounds: Normal heart sounds. Pulmonary:      Effort: Pulmonary effort is normal.      Breath sounds: Normal breath sounds. Abdominal:      General: Abdomen is flat. Bowel sounds are normal.      Palpations: Abdomen is soft. Skin:     General: Skin is warm and dry. Capillary Refill: Capillary refill takes less than 2 seconds. Neurological:      General: No focal deficit present. Mental Status: He is alert. Lab Results   Component Value Date    WBC 6.5 01/13/2021    HGB 16.3 01/13/2021    HCT 50.3 01/13/2021    MCV 89.2 01/13/2021     01/13/2021     Lab Results   Component Value Date     01/13/2021    K 4.2 01/13/2021     01/13/2021    CO2 27 01/13/2021    BUN 15 01/13/2021    CREATININE 0.95 01/13/2021    GLUCOSE 108 02/22/2014    CALCIUM 9.6 01/13/2021      Lab Results   Component Value Date    ALT 13 01/13/2021    AST 15 01/13/2021    ALKPHOS 64 01/13/2021    BILITOT 0.38 01/13/2021     Lab Results   Component Value Date    TSH 3.76 01/13/2021     Lab Results   Component Value Date    CHOL 160 01/13/2021     Lab Results   Component Value Date    TRIG 89 01/13/2021     Lab Results   Component Value Date    HDL 44 01/13/2021     Lab Results   Component Value Date    LDLCHOLESTEROL 98 01/13/2021     Lab Results   Component Value Date    CHOLHDLRATIO 3.6 01/13/2021     No results found for: LABA1C  No results found for: [id number]  No results found for: FOLATE  No results found for: IRON, TIBC, FERRITIN  Lab Results   Component Value Date    VITD25 15.8 (L) 01/13/2021     I personally reviewed testing with patient. Otherwise labs within normal limits  ASSESSMENT AND PLAN  1.  Moderate major depression, single episode (HCC)    - venlafaxine (EFFEXOR XR) 37.5 MG extended release capsule; Take 1 capsule by mouth daily  Dispense: 30 capsule; Refill: 0    2. Attention deficit hyperactivity disorder (ADHD), combined type    - venlafaxine (EFFEXOR XR) 37.5 MG extended release capsule; Take 1 capsule by mouth daily  Dispense: 30 capsule; Refill: 0    3. Anxiety    - venlafaxine (EFFEXOR XR) 37.5 MG extended release capsule; Take 1 capsule by mouth daily  Dispense: 30 capsule; Refill: 0     No orders of the defined types were placed in this encounter. Orders Placed This Encounter   Medications    venlafaxine (EFFEXOR XR) 37.5 MG extended release capsule     Sig: Take 1 capsule by mouth daily     Dispense:  30 capsule     Refill:  0      Data Unavailable  There are no preventive care reminders to display for this patient. Medications Discontinued During This Encounter   Medication Reason    buPROPion (WELLBUTRIN XL) 150 MG extended release tablet     FLUoxetine (PROZAC) 20 MG capsule      The patient's past medical, surgical, social, and family history as well as his   current medications and allergies were reviewed as documented in today's encounter. Medications, labs, diagnostic studies, consultations and follow-up as documented in this encounter. No follow-ups on file. Future Appointments   Date Time Provider David Gupta   2/10/2021 11:30 AM FLAKO Paulson CNP     This note was completed by using the assistance of a speech-recognition program. However, inadvertent computerized transcription errors may be present. Although every effort was made to ensure accuracy, no guarantees can be provided that every mistake has been identified and corrected by editing.   Electronically signed by FLAKO Mock CNP on 2/3/2021 at 2:59 PM

## 2021-02-10 ENCOUNTER — OFFICE VISIT (OUTPATIENT)
Dept: PRIMARY CARE CLINIC | Age: 31
End: 2021-02-10
Payer: COMMERCIAL

## 2021-02-10 VITALS
HEART RATE: 97 BPM | BODY MASS INDEX: 28.88 KG/M2 | HEIGHT: 74 IN | OXYGEN SATURATION: 95 % | SYSTOLIC BLOOD PRESSURE: 120 MMHG | DIASTOLIC BLOOD PRESSURE: 76 MMHG | WEIGHT: 225 LBS | TEMPERATURE: 98 F

## 2021-02-10 DIAGNOSIS — F51.01 PRIMARY INSOMNIA: Chronic | ICD-10-CM

## 2021-02-10 DIAGNOSIS — F41.9 ANXIETY: Primary | Chronic | ICD-10-CM

## 2021-02-10 PROCEDURE — 99213 OFFICE O/P EST LOW 20 MIN: CPT | Performed by: NURSE PRACTITIONER

## 2021-02-10 ASSESSMENT — PATIENT HEALTH QUESTIONNAIRE - PHQ9
SUM OF ALL RESPONSES TO PHQ QUESTIONS 1-9: 2
SUM OF ALL RESPONSES TO PHQ QUESTIONS 1-9: 2
1. LITTLE INTEREST OR PLEASURE IN DOING THINGS: 1
2. FEELING DOWN, DEPRESSED OR HOPELESS: 1

## 2021-02-10 NOTE — PROGRESS NOTES
Davi Santana  MEDICINE  [address] [facility] [address]  Dept: [phone]       Ad Hensley is a 31 y.o. male who presents to the office today for evaluation of Follow-up (meds) and Anxiety    Pt here for f/u visit. Last visit we had started effexor, pt reports he took it for 4 days but it was causing extreme nausea and he was unable to eat. He stopped taking it and the nausea went away. Pt started a supplement 3 days ago aimed at increasing dopamine from an [employer] store that he's been taking half capsule daily for 3 days. The last 3 days he reports he is sleeping at night (without trazodone), his brain fog has subsided and his anxiety has subsided moderately, he is still having a period of 1-3 hours during the day where he is feeling very anxious but overall he is feeling a lot of improvement. He is currently off work but scheduled to go back Feb 23rd and at this time he feels comfortable with that. We discussed continuing the supplement and seeing how things go, he seems to be very sensitive to medications and side effects so I think it's best to minimize meds if possible. It sounds like he is improving quite a bit with this supplement and I feel comfortable continuing it. We discussed he should contact me if things worsen or change and also contact me if he decides he wants to change his RTW date. Otherwise he will f/u in one month. Pt understands and agrees with plan. Overall I am very happy with his progress from when I first saw him to today. Patient Active Problem List    Diagnosis Date Noted    Primary insomnia 01/20/2021    Hypovitaminosis D 01/20/2021    Moderate major depression, single episode ([facility] 01/13/2021    Anxiety 01/13/2021    ADD (attention deficit disorder) 09/15/2016     Patient's BMI is Body mass index is 28.89 kg/m².    Social History     Tobacco Use    Smoking status: Former Smoker     Packs/day: 0.25     Years: 2.00 Pack years: 0.50     Quit date: 2016     Years since quittin.0    Smokeless tobacco: Former User   Substance Use Topics    Alcohol use: No    Drug use: Never      Past Medical History:   Diagnosis Date    ADD (attention deficit disorder) 9/15/2016    Anxiety       PHQ-9 Total Score: 2 (2/10/2021 11:33 AM)     Immunization:  Immunization History   Administered Date(s) Administered    Tdap (Boostrix, Adacel) 2008    Varicella (Varivax) 1990, 1993     Past Surgical History:   Procedure Laterality Date    FRACTURE SURGERY Right     age 7     Family History   Problem Relation Age of Onset    Diabetes Mother     Cancer Sister 28        neck and throat cancer     Current Outpatient Medications   Medication Sig Dispense Refill    traZODone (DESYREL) 50 MG tablet Take 50 mg by mouth nightly       No current facility-administered medications for this visit. The patient's past medical, surgical, social, and family history as well as his current medications and allergies were reviewed as documented in today's encounter. Review of Systems   Constitutional: Negative. HENT: Negative. Eyes: Negative. Respiratory: Negative. Cardiovascular: Negative. Gastrointestinal: Negative. Endocrine: Negative. Genitourinary: Negative. Musculoskeletal: Negative. Skin: Negative. Allergic/Immunologic: Negative. Neurological: Negative. Hematological: Negative. Psychiatric/Behavioral: The patient is nervous/anxious. All other systems reviewed and are negative. /76 (Site: Left Upper Arm, Position: Sitting, Cuff Size: Large Adult)   Pulse 97   Temp 98 °F (36.7 °C) (Temporal)   Ht 6' 2\" (1.88 m)   Wt 225 lb (102.1 kg)   SpO2 95%   BMI 28.89 kg/m²   Physical Exam  Vitals signs and nursing note reviewed. Constitutional:       Appearance: Normal appearance. He is normal weight.    HENT: Right Ear: Tympanic membrane, ear canal and external ear normal.      Left Ear: Tympanic membrane, ear canal and external ear normal.      Nose: Nose normal.      Mouth/Throat:      Mouth: Mucous membranes are moist.      Pharynx: Oropharynx is clear. Eyes:      Extraocular Movements: Extraocular movements intact. Conjunctiva/sclera: Conjunctivae normal.      Pupils: Pupils are equal, round, and reactive to light. Neck:      Musculoskeletal: Normal range of motion. Cardiovascular:      Rate and Rhythm: Normal rate and regular rhythm. Pulses: Normal pulses. Heart sounds: Normal heart sounds. Pulmonary:      Effort: Pulmonary effort is normal.      Breath sounds: Normal breath sounds. Abdominal:      General: Abdomen is flat. Bowel sounds are normal.      Palpations: Abdomen is soft. Skin:     General: Skin is warm and dry. Capillary Refill: Capillary refill takes less than 2 seconds. Neurological:      General: No focal deficit present. Mental Status: He is alert. Psychiatric:         Mood and Affect: Mood normal.         Behavior: Behavior normal.         Thought Content:  Thought content normal.         Judgment: Judgment normal.       Lab Results   Component Value Date    WBC 6.5 01/13/2021    HGB 16.3 01/13/2021    HCT 50.3 01/13/2021    MCV 89.2 01/13/2021     01/13/2021     Lab Results   Component Value Date     01/13/2021    K 4.2 01/13/2021     01/13/2021    CO2 27 01/13/2021    BUN 15 01/13/2021    CREATININE 0.95 01/13/2021    GLUCOSE 108 02/22/2014    CALCIUM 9.6 01/13/2021      Lab Results   Component Value Date    ALT 13 01/13/2021    AST 15 01/13/2021    ALKPHOS 64 01/13/2021    BILITOT 0.38 01/13/2021     Lab Results   Component Value Date    TSH 3.76 01/13/2021     Lab Results   Component Value Date    CHOL 160 01/13/2021     Lab Results   Component Value Date    TRIG 89 01/13/2021     Lab Results   Component Value Date HDL 44 01/13/2021     Lab Results   Component Value Date    LDLCHOLESTEROL 98 01/13/2021     Lab Results   Component Value Date    CHOLHDLRATIO 3.6 01/13/2021     No results found for: LABA1C  No results found for: [id number]  No results found for: FOLATE  No results found for: IRON, TIBC, FERRITIN  Lab Results   Component Value Date    VITD25 15.8 (L) 01/13/2021     I personally reviewed testing with patient. Otherwise labs within normal limits  ASSESSMENT AND PLAN  1. Anxiety      2. Primary insomnia       No orders of the defined types were placed in this encounter. No orders of the defined types were placed in this encounter. Data Unavailable  There are no preventive care reminders to display for this patient. Medications Discontinued During This Encounter   Medication Reason    venlafaxine (EFFEXOR XR) 37.5 MG extended release capsule Side effects     The patient's past medical, surgical, social, and family history as well as his   current medications and allergies were reviewed as documented in today's encounter. Medications, labs, diagnostic studies, consultations and follow-up as documented in this encounter. No follow-ups on file. No future appointments. This note was completed by using the assistance of a speech-recognition program. However, inadvertent computerized transcription errors may be present. Although every effort was made to ensure accuracy, no guarantees can be provided that every mistake has been identified and corrected by editing.   Electronically signed by FLAKO Parkinson CNP on 2/10/2021 at 1:16 PM

## 2021-03-02 ENCOUNTER — OFFICE VISIT (OUTPATIENT)
Dept: PRIMARY CARE CLINIC | Age: 31
End: 2021-03-02
Payer: COMMERCIAL

## 2021-03-02 VITALS
BODY MASS INDEX: 29.12 KG/M2 | TEMPERATURE: 98.6 F | DIASTOLIC BLOOD PRESSURE: 78 MMHG | WEIGHT: 226.9 LBS | SYSTOLIC BLOOD PRESSURE: 116 MMHG | HEIGHT: 74 IN | HEART RATE: 91 BPM | OXYGEN SATURATION: 98 %

## 2021-03-02 DIAGNOSIS — F41.9 ANXIETY: Chronic | ICD-10-CM

## 2021-03-02 DIAGNOSIS — F51.01 PRIMARY INSOMNIA: Chronic | ICD-10-CM

## 2021-03-02 DIAGNOSIS — F32.1 MODERATE MAJOR DEPRESSION, SINGLE EPISODE (HCC): Primary | Chronic | ICD-10-CM

## 2021-03-02 PROCEDURE — 99214 OFFICE O/P EST MOD 30 MIN: CPT | Performed by: NURSE PRACTITIONER

## 2021-03-02 ASSESSMENT — ENCOUNTER SYMPTOMS
EYES NEGATIVE: 1
DIARRHEA: 1
ALLERGIC/IMMUNOLOGIC NEGATIVE: 1
RESPIRATORY NEGATIVE: 1

## 2021-03-02 NOTE — PROGRESS NOTES
Davi Santana [address] [place] [id number]  Dept: [phone]       Elroy Causey is a 31 y.o. male who presents to the office today for evaluation of Depression    Pt here for f/u for ongoing issues with depression, anxiety, fatigue and brain fog as well as inability to focus. Pt is sleeping better, anxiety is better but is still struggling with brain fog, inability to focus as well as fatigue. He sleeps 7-8 hours every night but still does not feel rested, feels tired all the time. Has been having diarrhea as well. Did not respond well to SSRI or effexor. Pt has a consultation with an integrative med physician 3. Pt has been off work and still doesn't feel able to return, doesn't feel able to focus/concentrate to perform job safely. We discussed a low glycemic and anti inflammatory diet. We discussed a daily exercise regimen. We extended leave for work until 21 to give him more time to get symptoms under control. Pt understands and agreeable to plan, will f/u in one month but advised to contact me before hand if he has questions or issues. Patient Active Problem List    Diagnosis Date Noted    Primary insomnia 2021    Hypovitaminosis D 2021    Moderate major depression, single episode ([facility] [address] 2021    Anxiety 2021    ADD (attention deficit disorder) 09/15/2016     Patient's BMI is Body mass index is 29.13 kg/m².    Social History     Tobacco Use    Smoking status: Former Smoker     Packs/day: 0.25     Years: 2.00     Pack years: 0.50     Quit date: 2016     Years since quittin.0    Smokeless tobacco: Former User   Substance Use Topics    Alcohol use: No    Drug use: Never      Past Medical History:   Diagnosis Date    ADD (attention deficit disorder) 9/15/2016    Anxiety       No data recorded   Immunization:  Immunization History   Administered Date(s) Administered    Tdap (Boostrix, Adacel) 2008  Varicella (Varivax) 06/27/1990, 06/27/1993     Past Surgical History:   Procedure Laterality Date    FRACTURE SURGERY Right     age 7     Family History   Problem Relation Age of Onset    Diabetes Mother     Cancer Sister 28        neck and throat cancer     Current Outpatient Medications   Medication Sig Dispense Refill    traZODone (DESYREL) 50 MG tablet Take 50 mg by mouth nightly       No current facility-administered medications for this visit. The patient's past medical, surgical, social, and family history as well as his current medications and allergies were reviewed as documented in today's encounter. Review of Systems   Constitutional: Positive for fatigue. HENT: Negative. Eyes: Negative. Respiratory: Negative. Cardiovascular: Negative. Gastrointestinal: Positive for diarrhea. Endocrine: Negative. Genitourinary: Negative. Musculoskeletal: Negative. Skin: Negative. Allergic/Immunologic: Negative. Neurological: Negative. Hematological: Negative. Psychiatric/Behavioral: Positive for decreased concentration and sleep disturbance. The patient is nervous/anxious. All other systems reviewed and are negative. /78 (Site: Left Upper Arm, Position: Sitting, Cuff Size: Medium Adult)   Pulse 91   Temp 98.6 °F (37 °C) (Temporal)   Ht 6' 2\" (1.88 m)   Wt 226 lb 14.4 oz (102.9 kg)   SpO2 98%   BMI 29.13 kg/m²   Physical Exam  Vitals signs and nursing note reviewed. Constitutional:       Appearance: Normal appearance. He is normal weight. HENT:      Nose: Nose normal.      Mouth/Throat:      Mouth: Mucous membranes are moist.      Pharynx: Oropharynx is clear. Eyes:      Extraocular Movements: Extraocular movements intact. Conjunctiva/sclera: Conjunctivae normal.      Pupils: Pupils are equal, round, and reactive to light. Neck:      Musculoskeletal: Normal range of motion.    Cardiovascular: Rate and Rhythm: Normal rate and regular rhythm. Pulses: Normal pulses. Heart sounds: Normal heart sounds. Pulmonary:      Effort: Pulmonary effort is normal.      Breath sounds: Normal breath sounds. Abdominal:      General: Abdomen is flat. Bowel sounds are normal.      Palpations: Abdomen is soft. Skin:     General: Skin is warm and dry. Capillary Refill: Capillary refill takes less than 2 seconds. Neurological:      General: No focal deficit present. Mental Status: He is alert. Psychiatric:         Attention and Perception: Attention normal.         Mood and Affect: Mood is depressed. Affect is blunt. Speech: Speech normal.         Behavior: Behavior normal.       Lab Results   Component Value Date    WBC 6.5 01/13/2021    HGB 16.3 01/13/2021    HCT 50.3 01/13/2021    MCV 89.2 01/13/2021     01/13/2021     Lab Results   Component Value Date     01/13/2021    K 4.2 01/13/2021     01/13/2021    CO2 27 01/13/2021    BUN 15 01/13/2021    CREATININE 0.95 01/13/2021    GLUCOSE 108 02/22/2014    CALCIUM 9.6 01/13/2021      Lab Results   Component Value Date    ALT 13 01/13/2021    AST 15 01/13/2021    ALKPHOS 64 01/13/2021    BILITOT 0.38 01/13/2021     Lab Results   Component Value Date    TSH 3.76 01/13/2021     Lab Results   Component Value Date    CHOL 160 01/13/2021     Lab Results   Component Value Date    TRIG 89 01/13/2021     Lab Results   Component Value Date    HDL 44 01/13/2021     Lab Results   Component Value Date    LDLCHOLESTEROL 98 01/13/2021     Lab Results   Component Value Date    CHOLHDLRATIO 3.6 01/13/2021     No results found for: LABA1C  No results found for: [id number]  No results found for: FOLATE  No results found for: IRON, TIBC, FERRITIN  Lab Results   Component Value Date    VITD25 15.8 (L) 01/13/2021     I personally reviewed testing with patient.   Otherwise labs within normal limits  ASSESSMENT AND PLAN 1. Moderate major depression, single episode ([facility] [address]      2. Anxiety      3. Primary insomnia       No orders of the defined types were placed in this encounter. No orders of the defined types were placed in this encounter. Data Unavailable  There are no preventive care reminders to display for this patient. There are no discontinued medications. The patient's past medical, surgical, social, and family history as well as his   current medications and allergies were reviewed as documented in today's encounter. Medications, labs, diagnostic studies, consultations and follow-up as documented in this encounter. Return in about 4 weeks (around 3/30/2021). Future Appointments   Date Time Provider David Gupta   4/2/2021  1:00 PM FLAKO Tyson CNP     This note was completed by using the assistance of a speech-recognition program. However, inadvertent computerized transcription errors may be present. Although every effort was made to ensure accuracy, no guarantees can be provided that every mistake has been identified and corrected by editing.   Electronically signed by FLAKO Cortez CNP on 3/2/2021 at 4:16 PM

## 2021-04-02 ENCOUNTER — OFFICE VISIT (OUTPATIENT)
Dept: PRIMARY CARE CLINIC | Age: 31
End: 2021-04-02
Payer: COMMERCIAL

## 2021-04-02 VITALS
WEIGHT: 230 LBS | TEMPERATURE: 98.4 F | HEIGHT: 74 IN | SYSTOLIC BLOOD PRESSURE: 120 MMHG | BODY MASS INDEX: 29.52 KG/M2 | HEART RATE: 82 BPM | OXYGEN SATURATION: 98 % | DIASTOLIC BLOOD PRESSURE: 82 MMHG

## 2021-04-02 DIAGNOSIS — F90.0 ATTENTION DEFICIT HYPERACTIVITY DISORDER (ADHD), PREDOMINANTLY INATTENTIVE TYPE: Chronic | ICD-10-CM

## 2021-04-02 DIAGNOSIS — F41.9 ANXIETY: Chronic | ICD-10-CM

## 2021-04-02 DIAGNOSIS — F32.1 MODERATE MAJOR DEPRESSION, SINGLE EPISODE (HCC): Primary | Chronic | ICD-10-CM

## 2021-04-02 PROCEDURE — 99214 OFFICE O/P EST MOD 30 MIN: CPT | Performed by: NURSE PRACTITIONER

## 2021-04-02 ASSESSMENT — ENCOUNTER SYMPTOMS
GASTROINTESTINAL NEGATIVE: 1
EYES NEGATIVE: 1
ALLERGIC/IMMUNOLOGIC NEGATIVE: 1
RESPIRATORY NEGATIVE: 1

## 2021-04-02 NOTE — PROGRESS NOTES
Anxiety       No data recorded   Immunization:  Immunization History   Administered Date(s) Administered    Tdap (Boostrix, Adacel) 06/27/2008    Varicella (Varivax) 06/27/1990, 06/27/1993     Past Surgical History:   Procedure Laterality Date    FRACTURE SURGERY Right     age 7     Family History   Problem Relation Age of Onset    Diabetes Mother     Cancer Sister 28        neck and throat cancer     Current Outpatient Medications   Medication Sig Dispense Refill    traZODone (DESYREL) 50 MG tablet Take 50 mg by mouth nightly       No current facility-administered medications for this visit. The patient's past medical, surgical, social, and family history as well as his current medications and allergies were reviewed as documented in today's encounter. Review of Systems   Constitutional: Positive for fatigue. HENT: Negative. Eyes: Negative. Respiratory: Negative. Cardiovascular: Negative. Gastrointestinal: Negative. Endocrine: Negative. Genitourinary: Negative. Musculoskeletal: Negative. Skin: Negative. Allergic/Immunologic: Negative. Neurological: Negative. Hematological: Negative. Psychiatric/Behavioral: Positive for decreased concentration. The patient is nervous/anxious. All other systems reviewed and are negative. /82 (Site: Right Upper Arm, Position: Sitting, Cuff Size: Medium Adult)   Pulse 82   Temp 98.4 °F (36.9 °C)   Ht 6' 2\" (1.88 m)   Wt 230 lb (104.3 kg)   SpO2 98%   BMI 29.53 kg/m²   Physical Exam  Vitals signs and nursing note reviewed. Constitutional:       Appearance: Normal appearance. Eyes:      Conjunctiva/sclera: Conjunctivae normal.      Pupils: Pupils are equal, round, and reactive to light. Cardiovascular:      Rate and Rhythm: Normal rate and regular rhythm. Pulses: Normal pulses. Heart sounds: Normal heart sounds. Musculoskeletal: Normal range of motion. Skin:     General: Skin is warm. Capillary Refill: Capillary refill takes less than 2 seconds. Neurological:      General: No focal deficit present. Mental Status: He is alert. Psychiatric:         Mood and Affect: Mood is depressed. Affect is tearful. Speech: Speech normal.         Behavior: Behavior normal.         Thought Content: Thought content normal.         Judgment: Judgment normal.       Lab Results   Component Value Date    WBC 6.5 01/13/2021    HGB 16.3 01/13/2021    HCT 50.3 01/13/2021    MCV 89.2 01/13/2021     01/13/2021     Lab Results   Component Value Date     01/13/2021    K 4.2 01/13/2021     01/13/2021    CO2 27 01/13/2021    BUN 15 01/13/2021    CREATININE 0.95 01/13/2021    GLUCOSE 108 02/22/2014    CALCIUM 9.6 01/13/2021      Lab Results   Component Value Date    ALT 13 01/13/2021    AST 15 01/13/2021    ALKPHOS 64 01/13/2021    BILITOT 0.38 01/13/2021     Lab Results   Component Value Date    TSH 3.76 01/13/2021     Lab Results   Component Value Date    CHOL 160 01/13/2021     Lab Results   Component Value Date    TRIG 89 01/13/2021     Lab Results   Component Value Date    HDL 44 01/13/2021     Lab Results   Component Value Date    LDLCHOLESTEROL 98 01/13/2021     Lab Results   Component Value Date    CHOLHDLRATIO 3.6 01/13/2021     No results found for: LABA1C  No results found for: [id number]  No results found for: FOLATE  No results found for: IRON, TIBC, FERRITIN  Lab Results   Component Value Date    VITD25 15.8 (L) 01/13/2021     I personally reviewed testing with patient. Otherwise labs within normal limits  ASSESSMENT AND PLAN  1. Moderate major depression, single episode (HCC)    - Deidre Torrez MD, Psychiatry, [place]    2. Anxiety    - Deidre Torrez MD, Psychiatry, [place]    3.  Attention deficit hyperactivity disorder (ADHD), predominantly inattentive type    - Deidre Torrez MD, Psychiatry, [place]     Orders Placed This Encounter   Procedures   Adelso Cool MD, Psychiatry, [place]     Referral Priority:   Routine     Referral Type:   Eval and Treat     Referral Reason:   Specialty Services Required     Referred to Provider:   Carlos Krueger MD     Requested Specialty:   Psychiatry     Number of Visits Requested:   1     No orders of the defined types were placed in this encounter. Data Unavailable  There are no preventive care reminders to display for this patient. There are no discontinued medications. The patient's past medical, surgical, social, and family history as well as his   current medications and allergies were reviewed as documented in today's encounter. Medications, labs, diagnostic studies, consultations and follow-up as documented in this encounter. Return in about 4 weeks (around 4/30/2021). Future Appointments   Date Time Provider David Gupta   4/21/2021  1:00 PM FLAKO Leonardo CNP     This note was completed by using the assistance of a speech-recognition program. However, inadvertent computerized transcription errors may be present. Although every effort was made to ensure accuracy, no guarantees can be provided that every mistake has been identified and corrected by editing.   Electronically signed by FLAKO Jacobs CNP on 4/2/2021 at 3:36 PM

## 2021-04-21 ENCOUNTER — OFFICE VISIT (OUTPATIENT)
Dept: PRIMARY CARE CLINIC | Age: 31
End: 2021-04-21
Payer: COMMERCIAL

## 2021-04-21 VITALS
WEIGHT: 224 LBS | HEART RATE: 83 BPM | HEIGHT: 74 IN | BODY MASS INDEX: 28.75 KG/M2 | OXYGEN SATURATION: 96 % | SYSTOLIC BLOOD PRESSURE: 110 MMHG | DIASTOLIC BLOOD PRESSURE: 82 MMHG

## 2021-04-21 DIAGNOSIS — F32.1 MODERATE MAJOR DEPRESSION, SINGLE EPISODE (HCC): Primary | Chronic | ICD-10-CM

## 2021-04-21 DIAGNOSIS — F41.9 ANXIETY: Chronic | ICD-10-CM

## 2021-04-21 DIAGNOSIS — F90.0 ATTENTION DEFICIT HYPERACTIVITY DISORDER (ADHD), PREDOMINANTLY INATTENTIVE TYPE: Chronic | ICD-10-CM

## 2021-04-21 PROCEDURE — 99214 OFFICE O/P EST MOD 30 MIN: CPT | Performed by: NURSE PRACTITIONER

## 2021-04-21 RX ORDER — ESCITALOPRAM OXALATE 10 MG/1
1 TABLET ORAL DAILY
COMMUNITY
Start: 2021-04-13 | End: 2021-07-20 | Stop reason: ALTCHOICE

## 2021-04-21 NOTE — PROGRESS NOTES
Davi Santana [address] [place] [id number]  Dept: [phone]       Mynor Arenas is a 31 y.o. male who presents to the office today for evaluation of Depression (Lexapro for 4-5 day, makes sleepy started to take at night.), Anxiety, and ADHD    Pt is here for f/u for ADD, anxiety and depression. Pt saw psychiatrist Dr. Jose Joyce who started him on lexapro, pt has been taking it for the last 4-5 days. Dr. Jose Joyce also prescribed modafinil to help with brain fog/decreased energy/focus but patient hasn't started it yet. Pt reports it making him sleepy, so he has switched to taking it at night, but otherwise he isn't experiencing any side effects. He is still off work and still does not feel comfortable returning due to still having symptoms that aren't improving. 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 Pt will continue following with Dr. Jose Joyce, extended leave to 2021 and will re evaluate closer to that time. Pt understands and agreeable to plan, also present is his fiance who understands plan as well. Patient Active Problem List    Diagnosis Date Noted    Primary insomnia 2021    Hypovitaminosis D 2021    Moderate major depression, single episode ([facility] [address] 2021    Anxiety 2021    ADD (attention deficit disorder) 09/15/2016     Patient's BMI is Body mass index is 28.76 kg/m².    Social History     Tobacco Use    Smoking status: Former Smoker     Packs/day: 0.25     Years: 2.00     Pack years: 0.50     Quit date: 2016     Years since quittin.2    Smokeless tobacco: Former User   Substance Use Topics    Alcohol use: No    Drug use: Never      Past Medical History:   Diagnosis Date    ADD (attention deficit disorder) 9/15/2016    Anxiety     Depression       No data recorded   Immunization:  Immunization History   Administered Date(s) Administered    Tdap (Boostrix, Adacel) 06/27/2008    Varicella (Varivax) 06/27/1990, 06/27/1993     Past Surgical History:   Procedure Laterality Date    FRACTURE SURGERY Right     age 7     Family History   Problem Relation Age of Onset    Diabetes Mother     Depression Mother     Cancer Sister 28        neck and throat cancer    Depression Sister      Current Outpatient Medications   Medication Sig Dispense Refill    modafinil (PROVIGIL) 200 MG tablet Take 200 mg by mouth daily.  escitalopram (LEXAPRO) 10 MG tablet Take 1 tablet by mouth daily      traZODone (DESYREL) 50 MG tablet Take 50 mg by mouth nightly       No current facility-administered medications for this visit. The patient's past medical, surgical, social, and family history as well as his current medications and allergies were reviewed as documented in today's encounter. Review of Systems   Constitutional: Positive for fatigue. HENT: Negative. Eyes: Negative. Respiratory: Negative. Cardiovascular: Negative. Gastrointestinal: Negative. Endocrine: Negative. Genitourinary: Negative. Musculoskeletal: Negative. Skin: Negative. Allergic/Immunologic: Negative. Neurological: Negative. Hematological: Negative. Psychiatric/Behavioral: Positive for decreased concentration. The patient is nervous/anxious. Depression, brain fog   All other systems reviewed and are negative. /82 (Site: Left Upper Arm, Position: Sitting, Cuff Size: Medium Adult)   Pulse 83   Ht 6' 2\" (1.88 m)   Wt 224 lb (101.6 kg)   SpO2 96%   BMI 28.76 kg/m²   Physical Exam  Vitals signs and nursing note reviewed. Constitutional:       Appearance: Normal appearance. He is normal weight.    HENT:      Left Ear: Ear canal normal. Nose: Nose normal.      Mouth/Throat:      Mouth: Mucous membranes are moist.      Pharynx: Oropharynx is clear. Eyes:      Extraocular Movements: Extraocular movements intact. Conjunctiva/sclera: Conjunctivae normal.      Pupils: Pupils are equal, round, and reactive to light. Neck:      Musculoskeletal: Normal range of motion. Cardiovascular:      Rate and Rhythm: Normal rate and regular rhythm. Pulses: Normal pulses. Heart sounds: Normal heart sounds. Pulmonary:      Effort: Pulmonary effort is normal.      Breath sounds: Normal breath sounds. Abdominal:      General: Abdomen is flat. Bowel sounds are normal.      Palpations: Abdomen is soft. Skin:     General: Skin is warm and dry. Capillary Refill: Capillary refill takes less than 2 seconds. Neurological:      General: No focal deficit present. Mental Status: He is alert. Psychiatric:         Attention and Perception: He is inattentive. Mood and Affect: Mood is depressed. Affect is tearful (at times). Thought Content:  Thought content normal.         Judgment: Judgment normal.       Lab Results   Component Value Date    WBC 6.5 01/13/2021    HGB 16.3 01/13/2021    HCT 50.3 01/13/2021    MCV 89.2 01/13/2021     01/13/2021     Lab Results   Component Value Date     01/13/2021    K 4.2 01/13/2021     01/13/2021    CO2 27 01/13/2021    BUN 15 01/13/2021    CREATININE 0.95 01/13/2021    GLUCOSE 108 02/22/2014    CALCIUM 9.6 01/13/2021      Lab Results   Component Value Date    ALT 13 01/13/2021    AST 15 01/13/2021    ALKPHOS 64 01/13/2021    BILITOT 0.38 01/13/2021     Lab Results   Component Value Date    TSH 3.76 01/13/2021     Lab Results   Component Value Date    CHOL 160 01/13/2021     Lab Results   Component Value Date    TRIG 89 01/13/2021     Lab Results   Component Value Date    HDL 44 01/13/2021     Lab Results   Component Value Date    LDLCHOLESTEROL 98 01/13/2021     Lab

## 2021-04-22 RX ORDER — MODAFINIL 200 MG/1
200 TABLET ORAL DAILY
COMMUNITY
End: 2021-06-08

## 2021-04-22 ASSESSMENT — ENCOUNTER SYMPTOMS
ALLERGIC/IMMUNOLOGIC NEGATIVE: 1
EYES NEGATIVE: 1
GASTROINTESTINAL NEGATIVE: 1
RESPIRATORY NEGATIVE: 1

## 2021-05-07 DIAGNOSIS — K52.9 CHRONIC DIARRHEA OF UNKNOWN ORIGIN: Primary | ICD-10-CM

## 2021-05-07 DIAGNOSIS — K21.9 GASTROESOPHAGEAL REFLUX DISEASE, UNSPECIFIED WHETHER ESOPHAGITIS PRESENT: ICD-10-CM

## 2021-05-11 ENCOUNTER — TELEPHONE (OUTPATIENT)
Dept: GASTROENTEROLOGY | Age: 31
End: 2021-05-11

## 2021-05-11 DIAGNOSIS — R00.0 TACHYCARDIA: ICD-10-CM

## 2021-05-11 DIAGNOSIS — R42 ORTHOSTATIC DIZZINESS: ICD-10-CM

## 2021-05-11 DIAGNOSIS — R53.82 CHRONIC FATIGUE: Primary | ICD-10-CM

## 2021-05-11 NOTE — TELEPHONE ENCOUNTER
Patient LVM 5/10/21 to schedule from referral.  Returned call today to schedule and had to leave a message for a call back.   First attempt

## 2021-05-20 ENCOUNTER — OFFICE VISIT (OUTPATIENT)
Dept: GASTROENTEROLOGY | Age: 31
End: 2021-05-20
Payer: COMMERCIAL

## 2021-05-20 ENCOUNTER — HOSPITAL ENCOUNTER (OUTPATIENT)
Age: 31
Discharge: HOME | End: 2021-05-20
Payer: COMMERCIAL

## 2021-05-20 VITALS — BODY MASS INDEX: 27.99 KG/M2 | WEIGHT: 218 LBS | DIASTOLIC BLOOD PRESSURE: 61 MMHG | SYSTOLIC BLOOD PRESSURE: 104 MMHG

## 2021-05-20 DIAGNOSIS — R00.0 TACHYCARDIA: ICD-10-CM

## 2021-05-20 DIAGNOSIS — K52.9 CHRONIC DIARRHEA OF UNKNOWN ORIGIN: Primary | ICD-10-CM

## 2021-05-20 DIAGNOSIS — R53.82 CHRONIC FATIGUE: ICD-10-CM

## 2021-05-20 DIAGNOSIS — R42 ORTHOSTATIC DIZZINESS: ICD-10-CM

## 2021-05-20 DIAGNOSIS — K52.9 CHRONIC DIARRHEA OF UNKNOWN ORIGIN: ICD-10-CM

## 2021-05-20 LAB
ABSOLUTE EOS #: 0.03 K/UL (ref 0–0.44)
ABSOLUTE IMMATURE GRANULOCYTE: <0.03 K/UL (ref 0–0.3)
ABSOLUTE LYMPH #: 2.29 K/UL (ref 1.1–3.7)
ABSOLUTE MONO #: 0.61 K/UL (ref 0.1–1.2)
ALBUMIN SERPL-MCNC: 4.4 G/DL (ref 3.5–5.2)
ALBUMIN/GLOBULIN RATIO: 1.6 (ref 1–2.5)
ALP BLD-CCNC: 71 U/L (ref 40–129)
ALT SERPL-CCNC: 18 U/L (ref 5–41)
ANION GAP SERPL CALCULATED.3IONS-SCNC: 11 MMOL/L (ref 9–17)
AST SERPL-CCNC: 16 U/L
BASOPHILS # BLD: 0 % (ref 0–2)
BASOPHILS ABSOLUTE: <0.03 K/UL (ref 0–0.2)
BILIRUB SERPL-MCNC: 0.62 MG/DL (ref 0.3–1.2)
BILIRUBIN DIRECT: 0.14 MG/DL
BILIRUBIN, INDIRECT: 0.48 MG/DL (ref 0–1)
BUN BLDV-MCNC: 12 MG/DL (ref 6–20)
BUN/CREAT BLD: NORMAL (ref 9–20)
CALCIUM SERPL-MCNC: 9.5 MG/DL (ref 8.6–10.4)
CHLORIDE BLD-SCNC: 106 MMOL/L (ref 98–107)
CO2: 26 MMOL/L (ref 20–31)
CREAT SERPL-MCNC: 0.89 MG/DL (ref 0.7–1.2)
DIFFERENTIAL TYPE: NORMAL
EOSINOPHILS RELATIVE PERCENT: 1 % (ref 1–4)
GFR AFRICAN AMERICAN: >60 ML/MIN
GFR NON-AFRICAN AMERICAN: >60 ML/MIN
GFR SERPL CREATININE-BSD FRML MDRD: NORMAL ML/MIN/{1.73_M2}
GFR SERPL CREATININE-BSD FRML MDRD: NORMAL ML/MIN/{1.73_M2}
GLOBULIN: NORMAL G/DL (ref 1.5–3.8)
GLUCOSE BLD-MCNC: 96 MG/DL (ref 70–99)
HCT VFR BLD CALC: 47.8 % (ref 40.7–50.3)
HEMOGLOBIN: 15.5 G/DL (ref 13–17)
IMMATURE GRANULOCYTES: 0 %
LYMPHOCYTES # BLD: 38 % (ref 24–43)
MCH RBC QN AUTO: 29.6 PG (ref 25.2–33.5)
MCHC RBC AUTO-ENTMCNC: 32.4 G/DL (ref 28.4–34.8)
MCV RBC AUTO: 91.2 FL (ref 82.6–102.9)
MONOCYTES # BLD: 10 % (ref 3–12)
NRBC AUTOMATED: 0 PER 100 WBC
PDW BLD-RTO: 12.7 % (ref 11.8–14.4)
PLATELET # BLD: 305 K/UL (ref 138–453)
PLATELET ESTIMATE: NORMAL
PMV BLD AUTO: 10.5 FL (ref 8.1–13.5)
POTASSIUM SERPL-SCNC: 4.4 MMOL/L (ref 3.7–5.3)
RBC # BLD: 5.24 M/UL (ref 4.21–5.77)
RBC # BLD: NORMAL 10*6/UL
SEG NEUTROPHILS: 51 % (ref 36–65)
SEGMENTED NEUTROPHILS ABSOLUTE COUNT: 3.02 K/UL (ref 1.5–8.1)
SODIUM BLD-SCNC: 143 MMOL/L (ref 135–144)
TOTAL PROTEIN: 7.1 G/DL (ref 6.4–8.3)
VITAMIN D 25-HYDROXY: 38.2 NG/ML (ref 30–100)
WBC # BLD: 6 K/UL (ref 3.5–11.3)
WBC # BLD: NORMAL 10*3/UL

## 2021-05-20 PROCEDURE — 85025 COMPLETE CBC W/AUTO DIFF WBC: CPT

## 2021-05-20 PROCEDURE — 86003 ALLG SPEC IGE CRUDE XTRC EA: CPT

## 2021-05-20 PROCEDURE — 82607 VITAMIN B-12: CPT

## 2021-05-20 PROCEDURE — 83516 IMMUNOASSAY NONANTIBODY: CPT

## 2021-05-20 PROCEDURE — 82746 ASSAY OF FOLIC ACID SERUM: CPT

## 2021-05-20 PROCEDURE — 86618 LYME DISEASE ANTIBODY: CPT

## 2021-05-20 PROCEDURE — 82784 ASSAY IGA/IGD/IGG/IGM EACH: CPT

## 2021-05-20 PROCEDURE — 80048 BASIC METABOLIC PNL TOTAL CA: CPT

## 2021-05-20 PROCEDURE — 80076 HEPATIC FUNCTION PANEL: CPT

## 2021-05-20 PROCEDURE — 82785 ASSAY OF IGE: CPT

## 2021-05-20 PROCEDURE — 82306 VITAMIN D 25 HYDROXY: CPT

## 2021-05-20 PROCEDURE — 36415 COLL VENOUS BLD VENIPUNCTURE: CPT

## 2021-05-20 PROCEDURE — 99203 OFFICE O/P NEW LOW 30 MIN: CPT | Performed by: INTERNAL MEDICINE

## 2021-05-20 RX ORDER — DICYCLOMINE HYDROCHLORIDE 10 MG/1
10 CAPSULE ORAL 4 TIMES DAILY
Qty: 120 CAPSULE | Refills: 3 | Status: SHIPPED | OUTPATIENT
Start: 2021-05-20 | End: 2021-07-20 | Stop reason: ALTCHOICE

## 2021-05-20 ASSESSMENT — ENCOUNTER SYMPTOMS
SHORTNESS OF BREATH: 1
ABDOMINAL DISTENTION: 1
NAUSEA: 1
DIARRHEA: 1
EYES NEGATIVE: 1

## 2021-05-20 NOTE — PROGRESS NOTES
Reason for Referral: Diarrhea      Phil May, APRN - CNP  [address] [address]  [place],  [address]    Chief Complaint   Patient presents with    New Patient     referred for chronic diarrhea    Diarrhea     Patients states having loose stools for past 3 months. States having loose bowels daily. Anywhere from 3-5 times a day. Denies bleeding. Denies pain associated. States as soon as he eats he has an urgency to go.  Melena     Patient states last black stool was 2 weeks ago. States having a few of them.  GI Problem     Patient states feeling fatigued, weak, SOB, dizzy. States when he starts eating he feels sick.  Bloated     States feeling bloated daily. As soon as he eats. HISTORY OF PRESENT ILLNESS: Azalea Gastelum is a 31 y.o. male with a past history remarkable for ADD, anxiety, depression, referred for evaluation of diarrhea. Patient reports that his diarrhea symptoms appear to be subacute in nature and have been progressing over the last 3 months. Denies any no loose watery bowel movements but does report no softer bowel consistency with increased frequency approximately 3-5 times per day. Does report situational anxiety that may be related to his bowel frequency. Denies any preceding infection symptoms but does report intermittent chills and a subjective weight loss since onset of his increased bowel frequency. Per patient, he is reported no symptoms of constantly being thirsty with some positional dizziness. And is unclear whether not this is related. No changes in appetite  No abdominal pain   No nausea and emesis  Constantly thirsty and dizziness. Smoker: None   Drinking history: None   Illicit drugs: None  Abdominal surgeries: none   Prior Colonoscopy: None   Prior EGD: None   FH of GI issues: None      Past Medical,Family, and Social History reviewed and does contribute to the patient presentingcondition.     Patient's PMH/PSH,SH,PSYCH Hx, MEDs, ALLERGIES, and ROS were all reviewed and updated in the appropriate sections. PAST MEDICAL HISTORY:  Past Medical History:   Diagnosis Date    ADD (attention deficit disorder) 9/15/2016    Anxiety     Depression        Past Surgical History:   Procedure Laterality Date    FRACTURE SURGERY Right     age 7       CURRENT MEDICATIONS:    Current Outpatient Medications:     modafinil (PROVIGIL) 200 MG tablet, Take 200 mg by mouth daily. , Disp: , Rfl:     escitalopram (LEXAPRO) 10 MG tablet, Take 1 tablet by mouth daily, Disp: , Rfl:     traZODone (DESYREL) 50 MG tablet, Take 50 mg by mouth nightly, Disp: , Rfl:     ALLERGIES:   No Known Allergies    FAMILY HISTORY:       Problem Relation Age of Onset    Diabetes Mother     Depression Mother     Cancer Sister 29        neck and throat cancer    Depression Sister     Esophageal Cancer Sister          SOCIAL HISTORY:   Social History     Socioeconomic History    Marital status: Single     Spouse name: Not on file    Number of children: Not on file    Years of education: Not on file    Highest education level: Not on file   Occupational History    Not on file   Tobacco Use    Smoking status: Former Smoker     Packs/day: 0.25     Years: 2.00     Pack years: 0.50     Quit date: 2016     Years since quittin.2    Smokeless tobacco: Former User   Vaping Use    Vaping Use: Never used   Substance and Sexual Activity    Alcohol use: No    Drug use: Never    Sexual activity: Yes     Partners: Female     Comment: one partner    Other Topics Concern    Not on file   Social History Narrative    Not on file     Social Determinants of Health     Financial Resource Strain:     Difficulty of Paying Living Expenses:    Food Insecurity:     Worried About Running Out of Food in the Last Year:     Ran Out of Food in the Last Year:    Transportation Needs:     Lack of Transportation (Medical):      Lack of Transportation (Non-Medical):    Physical Activity:     Days of Exercise per Week:     Minutes of Exercise per Session:    Stress:     Feeling of Stress :    Social Connections:     Frequency of Communication with Friends and Family:     Frequency of Social Gatherings with Friends and Family:     Attends Buddhism Services:     Active Member of Clubs or Organizations:     Attends Club or Organization Meetings:     Marital Status:    Intimate Partner Violence:     Fear of Current or Ex-Partner:     Emotionally Abused:     Physically Abused:     Sexually Abused:          REVIEW OF SYSTEMS: A 12-point review of systems was obtained and pertinent positives and negatives were listed below. REVIEW OF SYSTEMS:     Constitutional: No fever, no chills, no lethargy, no weakness. HEENT:  No headache, otalgia, itchy eyes, nasal discharge or sore throat. Cardiac:  No chest pain, dyspnea, orthopnea or PND. Chest:   No cough, phlegm or wheezing. Abdomen:      Detailed by MA   Neuro:  No focal weakness, abnormal movements or seizure like activity. Skin:   No rashes, no itching. :   No hematuria, no pyuria, no dysuria, no flank pain. Extremities:  No swelling or joint pains. ROS was otherwise negative    Review of Systems   Constitutional: Positive for fatigue. HENT: Negative. Eyes: Negative. Respiratory: Positive for shortness of breath. Gastrointestinal: Positive for abdominal distention, diarrhea and nausea. Endocrine: Negative. Genitourinary: Negative. Musculoskeletal: Negative. Skin: Negative. Neurological: Positive for dizziness. Hematological: Negative. Psychiatric/Behavioral: Positive for sleep disturbance. All other systems reviewed and are negative. PHYSICAL EXAMINATION: Vital signs reviewed per the nursing documentation. /61   Wt 218 lb (98.9 kg)   BMI 27.99 kg/m²   Body mass index is 27.99 kg/m².    Physical Exam    Physical Exam   Constitutional: Patient is oriented to person, place, and time. Patient appears well-developed and well-nourished. HENT:   Head: Normocephalic and atraumatic. Eyes: Pupils are equal, round, and reactive to light. EOM are normal.   Neck: Normal range of motion. Neck supple. No JVD present. No tracheal deviation present. No thyromegaly present. Cardiovascular: Normal rate, regular rhythm, normal heart sounds and intact distal pulses. Pulmonary/Chest: Effort normal and breath sounds normal. No stridor. No respiratory distress. He has no wheezes. He has no rales. He exhibits no tenderness. Abdominal: Soft. Bowel sounds are normal. He exhibits no distension and no mass. There is no tenderness. There is no rebound and no guarding. No hernia. Musculoskeletal: Normal range of motion. Lymphadenopathy:    Patient has no cervical adenopathy. Neurological: Patient is alert and oriented to person, place, and time. Psychiatric: Patient has a normal mood and affect. Patient behavior is normal.       LABORATORY DATA: Reviewed  Lab Results   Component Value Date    WBC 6.5 01/13/2021    HGB 16.3 01/13/2021    HCT 50.3 01/13/2021    MCV 89.2 01/13/2021     01/13/2021     01/13/2021    K 4.2 01/13/2021     01/13/2021    CO2 27 01/13/2021    BUN 15 01/13/2021    CREATININE 0.95 01/13/2021    LABALBU 4.6 01/13/2021    BILITOT 0.38 01/13/2021    ALKPHOS 64 01/13/2021    AST 15 01/13/2021    ALT 13 01/13/2021         Lab Results   Component Value Date    RBC 5.64 01/13/2021    HGB 16.3 01/13/2021    MCV 89.2 01/13/2021    MCH 28.9 01/13/2021    MCHC 32.4 01/13/2021    RDW 13.0 01/13/2021    MPV 9.9 01/13/2021    BASOPCT 1 01/13/2021    LYMPHSABS 2.29 01/13/2021    MONOSABS 0.64 01/13/2021    NEUTROABS 3.47 01/13/2021    EOSABS <0.03 01/13/2021    BASOSABS 0.04 01/13/2021         DIAGNOSTIC TESTING:     No results found.        IMPRESSION: Radha Bolden is a 31 y.o. male with a past history remarkable for ADD, anxiety, depression, referred for evaluation of diarrhea. Patient reports that his diarrhea symptoms appear to be subacute in nature and have been progressing over the last 3 months. Denies any no loose watery bowel movements but does report no softer bowel consistency with increased frequency approximately 3-5 times per day. Does report situational anxiety that may be related to his bowel frequency. Denies any preceding infection symptoms but does report intermittent chills and a subjective weight loss since onset of his increased bowel frequency. Per patient, he is reported no symptoms of constantly being thirsty with some positional dizziness. And is unclear whether not this is related. Assessment  1. Chronic diarrhea of unknown origin    2. Chronic fatigue    3. Orthostatic dizziness    4. Tachycardia        Gastrocolic reflex exaggerated. 2-3 bowel movements per day currently  Soft stools  Liquid bowel movements   Off trazadone which may be associated with his previous orthostatic changes    Not on modafinil. No longer on Adderall     PLAN:    1) Post-prandial orthostatic dizziness and hypotensionexaggerated gastrocolic reflex which may suggest possibility of dumping symptoms however this does not appear to be clinically that apparent. May need home glucose monitoring. We will send complete serological studies which include a celiac disease, IBD panel, full comprehensive panel. Patient reports that he was hiking 3 months ago and was bitten by a tick which he believes started his symptoms. We will send Lyme antibody. 2) patient's weight loss appears to be somewhat of a concern. We will send complete stool serologies which include ova and parasite and stool culture. He may require endoscopic evaluation to evaluate for possible inflammatory bowel disease. Thank you for allowing me to participate in the care of Mr. Freddy Velez. For any further questions please do not hesitate to contact me.       I have reviewed and agree with the MA/LPN ROS please refer to their documentation from today's encounter on a separate note. Eileen Daniels MD, MPH   [facility]  Office #: [phone]          this note is created with the assistance of a speech recognition program.  While intending to generate a document that actually reflects the content of the visit, the document can still have some errors including those of syntax and sound a like substitutions which may escape proof reading. It such instances, actual meaning can be extrapolated by contextual diversion.

## 2021-05-21 LAB
ALLERGEN BARLEY IGE: <0.1 KU/L (ref 0–0.34)
ALLERGEN BEEF: <0.1 KU/L (ref 0–0.34)
ALLERGEN CABBAGE IGE: <0.1 KU/L (ref 0–0.34)
ALLERGEN CARROT IGE: <0.1 KU/L (ref 0–0.34)
ALLERGEN CHICKEN IGE: <0.1 KU/L (ref 0–0.34)
ALLERGEN CODFISH IGE: <0.1 KU/L (ref 0–0.34)
ALLERGEN CORN IGE: <0.1 KU/L (ref 0–0.34)
ALLERGEN COW MILK IGE: <0.1 KU/L (ref 0–0.34)
ALLERGEN CRAB IGE: <0.1 KU/L (ref 0–0.34)
ALLERGEN EGG WHITE IGE: <0.1 KU/L (ref 0–0.34)
ALLERGEN GRAPE IGE: <0.1 KU/L (ref 0–0.34)
ALLERGEN LETTUCE IGE: <0.1 KU/L (ref 0–0.34)
ALLERGEN NAVY BEAN: <0.1 KU/L (ref 0–0.34)
ALLERGEN OAT: <0.1 KU/L (ref 0–0.34)
ALLERGEN ORANGE IGE: <0.1 KU/L (ref 0–0.34)
ALLERGEN PEANUT (F13) IGE: <0.1 KU/L (ref 0–0.34)
ALLERGEN PEPPER C. ANNUUM IGE: <0.1 KU/L (ref 0–0.34)
ALLERGEN PORK: <0.1 KU/L (ref 0–0.34)
ALLERGEN RICE IGE: <0.1 KU/L (ref 0–0.34)
ALLERGEN RYE IGE: <0.1 KU/L (ref 0–0.34)
ALLERGEN SOYBEAN IGE: <0.1 KU/L (ref 0–0.34)
ALLERGEN TOMATO IGE: <0.1 KU/L (ref 0–0.34)
ALLERGEN TUNA IGE: <0.1 KU/L (ref 0–0.34)
ALLERGEN WHEAT IGE: <0.1 KU/L (ref 0–0.34)
FOLATE: 10.4 NG/ML
GLIADIN DEAMINIDATED PEPTIDE AB IGA: 1 U/ML
GLIADIN DEAMINIDATED PEPTIDE AB IGG: 0.7 U/ML
IGA: 138 MG/DL (ref 70–400)
IGE: 3 IU/ML
POTATO, IGE: <0.1 KU/L (ref 0–0.34)
SHRIMP: <0.1 KU/L (ref 0–0.34)
TISSUE TRANSGLUTAMINASE IGA: 0.1 U/ML
VITAMIN B-12: 376 PG/ML (ref 232–1245)

## 2021-05-24 ENCOUNTER — HOSPITAL ENCOUNTER (OUTPATIENT)
Age: 31
Setting detail: SPECIMEN
Discharge: HOME | End: 2021-05-24
Payer: COMMERCIAL

## 2021-05-24 DIAGNOSIS — R42 ORTHOSTATIC DIZZINESS: ICD-10-CM

## 2021-05-24 DIAGNOSIS — R00.0 TACHYCARDIA: ICD-10-CM

## 2021-05-24 DIAGNOSIS — R53.82 CHRONIC FATIGUE: ICD-10-CM

## 2021-05-24 DIAGNOSIS — K52.9 CHRONIC DIARRHEA OF UNKNOWN ORIGIN: ICD-10-CM

## 2021-05-24 LAB — LYME ANTIBODY: 0.37

## 2021-05-24 PROCEDURE — 87329 GIARDIA AG IA: CPT

## 2021-05-24 PROCEDURE — 87328 CRYPTOSPORIDIUM AG IA: CPT

## 2021-05-24 PROCEDURE — 83993 ASSAY FOR CALPROTECTIN FECAL: CPT

## 2021-05-24 PROCEDURE — 87506 IADNA-DNA/RNA PROBE TQ 6-11: CPT

## 2021-05-25 LAB
CAMPYLOBACTER PCR: NORMAL
DIRECT EXAM: NORMAL
DIRECT EXAM: NORMAL
E COLI ENTEROTOXIGENIC PCR: NORMAL
Lab: NORMAL
PLESIOMONAS SHIGELLOIDES PCR: NORMAL
SALMONELLA PCR: NORMAL
SHIGATOXIN GENE PCR: NORMAL
SHIGELLA SP PCR: NORMAL
SPECIMEN DESCRIPTION: NORMAL
SPECIMEN DESCRIPTION: NORMAL
VIBRIO PCR: NORMAL
YERSINIA ENTEROCOLITICA PCR: NORMAL

## 2021-05-27 LAB — CALPROTECTIN, FECAL: 10 UG/G

## 2021-06-08 ENCOUNTER — OFFICE VISIT (OUTPATIENT)
Dept: PRIMARY CARE CLINIC | Age: 31
End: 2021-06-08
Payer: COMMERCIAL

## 2021-06-08 VITALS
HEART RATE: 68 BPM | OXYGEN SATURATION: 98 % | SYSTOLIC BLOOD PRESSURE: 120 MMHG | WEIGHT: 220 LBS | BODY MASS INDEX: 28.23 KG/M2 | HEIGHT: 74 IN | DIASTOLIC BLOOD PRESSURE: 70 MMHG

## 2021-06-08 DIAGNOSIS — R73.01 ELEVATED FASTING GLUCOSE: ICD-10-CM

## 2021-06-08 DIAGNOSIS — R52 BODY ACHES: Chronic | ICD-10-CM
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
## 2021-06-08 DIAGNOSIS — R53.82 CHRONIC FATIGUE: Primary | Chronic | ICD-10-CM

## 2021-06-08 DIAGNOSIS — G44.229 CHRONIC TENSION-TYPE HEADACHE, NOT INTRACTABLE: Chronic | ICD-10-CM

## 2021-06-08 DIAGNOSIS — K21.9 GASTROESOPHAGEAL REFLUX DISEASE WITHOUT ESOPHAGITIS: Chronic | ICD-10-CM

## 2021-06-08 DIAGNOSIS — R41.89 BRAIN FOG: Chronic | ICD-10-CM

## 2021-06-08 DIAGNOSIS — K52.9 CHRONIC DIARRHEA OF UNKNOWN ORIGIN: Chronic | ICD-10-CM

## 2021-06-08 DIAGNOSIS — R25.1 TREMOR OF BOTH HANDS: Chronic | ICD-10-CM

## 2021-06-08 DIAGNOSIS — F32.1 MODERATE MAJOR DEPRESSION, SINGLE EPISODE (HCC): Chronic | ICD-10-CM

## 2021-06-08 PROCEDURE — 99215 OFFICE O/P EST HI 40 MIN: CPT | Performed by: NURSE PRACTITIONER

## 2021-06-08 RX ORDER — MELOXICAM 7.5 MG/1
7.5 TABLET ORAL DAILY
Qty: 30 TABLET | Refills: 3 | Status: SHIPPED | OUTPATIENT
Start: 2021-06-08 | End: 2021-07-06

## 2021-06-08 NOTE — PROGRESS NOTES
Davi Santana [address] [place] [phone]  Dept: [phone]       Jersey Ramon is a 31 y.o. male who presents to the office today for evaluation of Depression (continues to sees psychm was started on modinifil and lexapro , states going to go through additional testing, states may be progressing in the right direction but still struggling and frustrated because this is still going on ), Joint Pain, and Fatigue    Pt is here for f/u. He saw a naturopath Dr. Eve De La Garza who requested I order several different lab tests, will order and get faxed over to Dr. Eve De La Garza once we get results. Pt is still seeing psychiatry, currently only on lexapro, not on trazodone anymore and stopped modafinil due to it causing anxiety. He has been off work due to these symptoms, and due to still having major issues with brain fog, fatigue, tremors, weakness, difficulty focusing/concentrating/processing info, still unable to drive. 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 Will extend FMLA leave. Due to tremors and headaches, discussed getting brain MRI and pt agreeable. Discussed trying mobic to help with body aches/joint pains, pt agreeable. Will f/u with pt once get lab results and MRI, will fill out FMLA paperwork and fax for patient. Pt understands and agreeable to plan.      Patient Active Problem List    Diagnosis Date Noted    Elevated fasting glucose 06/09/2021    Body aches 06/09/2021    Chronic tension-type headache, not intractable 06/08/2021    Tremor of both hands 06/08/2021    Brain fog 06/08/2021    Chronic diarrhea of unknown origin 05/07/2021    Gastroesophageal reflux disease 05/07/2021    Primary insomnia 01/20/2021    Hypovitaminosis D 01/20/2021    Moderate major depression, single episode ([address] 01/13/2021    Anxiety 01/13/2021    ADD (attention deficit disorder) 09/15/2016     Patient's BMI is Body mass index is 28.25 kg/m². Social History     Tobacco Use    Smoking status: Former Smoker     Packs/day: 0.25     Years: 2.00     Pack years: 0.50     Quit date: 2016     Years since quittin.3    Smokeless tobacco: Former User   Vaping Use    Vaping Use: Never used   Substance Use Topics    Alcohol use: No    Drug use: Never      Past Medical History:   Diagnosis Date    ADD (attention deficit disorder) 9/15/2016    Anxiety     Depression       No data recorded   Immunization:  Immunization History   Administered Date(s) Administered    Tdap (Boostrix, Adacel) 2008    Varicella (Varivax) 1990, 1993     Past Surgical History:   Procedure Laterality Date    FRACTURE SURGERY Right     age 7     Family History   Problem Relation Age of Onset    Diabetes Mother     Depression Mother     Cancer Sister 28        neck and throat cancer    Depression Sister     Esophageal Cancer Sister      Current Outpatient Medications   Medication Sig Dispense Refill    meloxicam (MOBIC) 7.5 MG tablet Take 1 tablet by mouth daily 30 tablet 3    dicyclomine (BENTYL) 10 MG capsule Take 1 capsule by mouth 4 times daily 120 capsule 3    escitalopram (LEXAPRO) 10 MG tablet Take 1 tablet by mouth daily       No current facility-administered medications for this visit. The patient's past medical, surgical, social, and family history as well as his current medications and allergies were reviewed as documented in today's encounter. Review of Systems   Constitutional: Positive for fatigue. HENT: Negative. Eyes: Negative. Respiratory: Negative. Cardiovascular: Negative. Gastrointestinal: Negative. Endocrine: Negative. Genitourinary: Negative. Musculoskeletal: Positive for arthralgias and myalgias. Skin: Negative. Allergic/Immunologic: Negative. Neurological: Positive for dizziness, tremors, weakness and headaches. Hematological: Negative.     Psychiatric/Behavioral: Positive for decreased concentration. Depression   All other systems reviewed and are negative. /70   Pulse 68   Ht 6' 2\" (1.88 m)   Wt 220 lb (99.8 kg)   SpO2 98%   BMI 28.25 kg/m²   Physical Exam  Vitals and nursing note reviewed. Constitutional:       Appearance: Normal appearance. He is normal weight. HENT:      Right Ear: Tympanic membrane, ear canal and external ear normal.      Left Ear: Tympanic membrane, ear canal and external ear normal.      Nose: Nose normal.      Mouth/Throat:      Mouth: Mucous membranes are moist.      Pharynx: Oropharynx is clear. Eyes:      Extraocular Movements: Extraocular movements intact. Conjunctiva/sclera: Conjunctivae normal.      Pupils: Pupils are equal, round, and reactive to light. Cardiovascular:      Rate and Rhythm: Normal rate and regular rhythm. Pulses: Normal pulses. Heart sounds: Normal heart sounds. Pulmonary:      Effort: Pulmonary effort is normal.      Breath sounds: Normal breath sounds. Abdominal:      General: Abdomen is flat. Bowel sounds are normal.      Palpations: Abdomen is soft. Musculoskeletal:         General: Normal range of motion. Cervical back: Normal range of motion. Skin:     General: Skin is warm and dry. Capillary Refill: Capillary refill takes less than 2 seconds. Neurological:      General: No focal deficit present. Mental Status: He is alert. Cranial Nerves: Cranial nerves are intact. Motor: Tremor (bilateral hands) present. Psychiatric:         Mood and Affect: Mood is depressed.          Cognition and Memory: Cognition and memory normal.       Lab Results   Component Value Date    WBC 6.0 05/20/2021    HGB 15.5 05/20/2021    HCT 47.8 05/20/2021    MCV 91.2 05/20/2021     05/20/2021     Lab Results   Component Value Date     05/20/2021    K 4.4 05/20/2021     05/20/2021    CO2 26 05/20/2021    BUN 12 05/20/2021    CREATININE 0.89 05/20/2021    GLUCOSE 96 05/20/2021    CALCIUM 9.5 05/20/2021      Lab Results   Component Value Date    ALT 18 05/20/2021    AST 16 05/20/2021    ALKPHOS 71 05/20/2021    BILITOT 0.62 05/20/2021     Lab Results   Component Value Date    TSH 3.76 01/13/2021     Lab Results   Component Value Date    CHOL 160 01/13/2021     Lab Results   Component Value Date    TRIG 89 01/13/2021     Lab Results   Component Value Date    HDL 44 01/13/2021     Lab Results   Component Value Date    LDLCHOLESTEROL 98 01/13/2021     Lab Results   Component Value Date    CHOLHDLRATIO 3.6 01/13/2021     No results found for: LABA1C  Lab Results   Component Value Date    [id number] 051 05/20/2021     Lab Results   Component Value Date    FOLATE 10.4 05/20/2021     No results found for: IRON, TIBC, FERRITIN  Lab Results   Component Value Date    VITD25 38.2 05/20/2021     I personally reviewed testing with patient. Otherwise labs within normal limits  ASSESSMENT AND PLAN  1. Tick bite, sequela    - Lyme Disease, Western Blot; Future  - Bartonella Henselae ([place]) Antibodies IgG & IgM by IFA; Future    2. Chronic fatigue    - TSH With Reflex Ft4; Future  - T3, Free; Future  - Thyroid Peroxidase Antibody; Future  - Anti-Thyroglobulin Antibody; Future  - Vitamin B12 & Folate; Future  - Ferritin; Future  - Iron And TIBC; Future  - Sedimentation Rate; Future  - C-Reactive Protein; Future  - Testosterone, Free; Future  - DHEA-Sulfate; Future  - MRI BRAIN W WO CONTRAST; Future    3. Brain fog    - TSH With Reflex Ft4; Future  - T3, Free; Future  - Thyroid Peroxidase Antibody; Future  - Anti-Thyroglobulin Antibody; Future  - Vitamin B12 & Folate; Future  - Ferritin; Future  - Iron And TIBC; Future  - Sedimentation Rate; Future  - C-Reactive Protein; Future  - Testosterone, Free; Future  - DHEA-Sulfate; Future  - MRI BRAIN W WO CONTRAST; Future    4. Body aches    - Vitamin B12 & Folate; Future  - Ferritin; Future  - Iron And TIBC;  Future  - Sedimentation Rate; Future  - C-Reactive Protein; Future  - meloxicam (MOBIC) 7.5 MG tablet; Take 1 tablet by mouth daily  Dispense: 30 tablet; Refill: 3    5. Moderate major depression, single episode ([address]      6. Chronic diarrhea of unknown origin      7. Gastroesophageal reflux disease without esophagitis      8. Elevated fasting glucose    - Hemoglobin A1C; Future    9. Tremor of both hands    - MRI BRAIN W WO CONTRAST; Future    10. Chronic tension-type headache, not intractable    - MRI BRAIN W WO CONTRAST; Future     Orders Placed This Encounter   Procedures    MRI BRAIN W WO CONTRAST     Standing Status:   Future     Standing Expiration Date:   6/8/2022    Lyme Disease, Western Blot     Standing Status:   Future     Standing Expiration Date:   6/8/2022    Bartonella Henselae (Cat [place]ratch) Antibodies IgG & IgM by IFA     Standing Status:   Future     Standing Expiration Date:   6/8/2022    TSH With Reflex Ft4     Standing Status:   Future     Standing Expiration Date:   6/8/2022    T3, Free     Standing Status:   Future     Standing Expiration Date:   6/8/2022    Thyroid Peroxidase Antibody     Standing Status:   Future     Standing Expiration Date:   6/8/2022    Anti-Thyroglobulin Antibody     Standing Status:   Future     Standing Expiration Date:   6/8/2022    Vitamin B12 & Folate     Standing Status:   Future     Standing Expiration Date:   6/8/2022    Ferritin     Standing Status:   Future     Standing Expiration Date:   6/8/2022    Iron And TIBC     Standing Status:   Future     Standing Expiration Date:   6/8/2022     Order Specific Question:   Is Patient Fasting? Answer:   yes     Order Specific Question:   No of Hours?      Answer:   10    Hemoglobin A1C     Standing Status:   Future     Standing Expiration Date:   6/8/2022    Sedimentation Rate     Standing Status:   Future     Standing Expiration Date:   6/8/2022    C-Reactive Protein     Standing Status:   Future     Standing Expiration Date: 6/8/2022    Testosterone, Free     Standing Status:   Future     Standing Expiration Date:   6/8/2022    DHEA-Sulfate     Standing Status:   Future     Standing Expiration Date:   6/8/2022     Orders Placed This Encounter   Medications    meloxicam (MOBIC) 7.5 MG tablet     Sig: Take 1 tablet by mouth daily     Dispense:  30 tablet     Refill:  3      Data Unavailable  There are no preventive care reminders to display for this patient. Medications Discontinued During This Encounter   Medication Reason    traZODone (DESYREL) 50 MG tablet     modafinil (PROVIGIL) 200 MG tablet      The patient's past medical, surgical, social, and family history as well as his   current medications and allergies were reviewed as documented in today's encounter. Medications, labs, diagnostic studies, consultations and follow-up as documented in this encounter. Return in about 4 weeks (around 7/6/2021). Future Appointments   Date Time Provider David Gupta   7/7/2021  4:00 PM MD Faiza Silverio   7/14/2021 12:00 PM FLAKO Moscoso CNP     This note was completed by using the assistance of a speech-recognition program. However, inadvertent computerized transcription errors may be present. Although every effort was made to ensure accuracy, no guarantees can be provided that every mistake has been identified and corrected by editing.   Electronically signed by Bishop Dance, APRN - CNP on 6/9/2021 at 1:29 PM

## 2021-06-09 ENCOUNTER — TELEPHONE (OUTPATIENT)
Dept: PRIMARY CARE CLINIC | Age: 31
End: 2021-06-09

## 2021-06-09 PROBLEM — R52 BODY ACHES: Status: ACTIVE | Noted: 2021-06-09

## 2021-06-09 PROBLEM — R73.01 ELEVATED FASTING GLUCOSE: Status: ACTIVE | Noted: 2021-06-09

## 2021-06-16 ENCOUNTER — HOSPITAL ENCOUNTER (OUTPATIENT)
Age: 31
Setting detail: SPECIMEN
Discharge: HOME | End: 2021-06-16
Payer: COMMERCIAL

## 2021-06-16 DIAGNOSIS — R52 BODY ACHES: Chronic | ICD-10-CM

## 2021-06-16 DIAGNOSIS — R73.01 ELEVATED FASTING GLUCOSE: ICD-10-CM

## 2021-06-16 DIAGNOSIS — R41.89 BRAIN FOG: Chronic | ICD-10-CM
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
## 2021-06-16 DIAGNOSIS — R53.82 CHRONIC FATIGUE: Chronic | ICD-10-CM

## 2021-06-16 LAB
C-REACTIVE PROTEIN: <3 MG/L (ref 0–5)
FERRITIN: 113 UG/L (ref 30–400)
FOLATE: 16.7 NG/ML
IRON SATURATION: 41 % (ref 20–55)
IRON: 115 UG/DL (ref 59–158)
SEDIMENTATION RATE, ERYTHROCYTE: 1 MM (ref 0–15)
SEX HORMONE BINDING GLOBULIN: 39 NMOL/L (ref 11–80)
T3 FREE: 3.3 PG/ML (ref 2.02–4.43)
TESTOSTERONE FREE-NONMALE: 165.3 PG/ML (ref 47–244)
TESTOSTERONE TOTAL: 797 NG/DL (ref 220–1000)
TOTAL IRON BINDING CAPACITY: 281 UG/DL (ref 250–450)
TSH SERPL DL<=0.05 MIU/L-ACNC: 1.27 MIU/L (ref 0.3–5)
UNSATURATED IRON BINDING CAPACITY: 166 UG/DL (ref 112–347)
VITAMIN B-12: 492 PG/ML (ref 232–1245)

## 2021-06-17 LAB
DHEAS (DHEA SULFATE): 304 UG/DL (ref 120–520)
ESTIMATED AVERAGE GLUCOSE: 114 MG/DL
HBA1C MFR BLD: 5.6 % (ref 4–6)
THYROGLOBULIN AB: <12 IU/ML (ref 0–40)
THYROID PEROXIDASE (TPO) AB: <4 IU/ML (ref 0–25)

## 2021-06-18 ENCOUNTER — TELEPHONE (OUTPATIENT)
Dept: PRIMARY CARE CLINIC | Age: 31
End: 2021-06-18

## 2021-06-18 LAB
LYME IGM WB: NEGATIVE
LYME WESTERN BLOT IGG: NEGATIVE

## 2021-06-21 LAB
BARTONELLA HENSELAE AB, IGG: NORMAL
BARTONELLA HENSELAE AB, IGM: NORMAL

## 2021-06-23 ENCOUNTER — HOSPITAL ENCOUNTER (OUTPATIENT)
Dept: CARDIAC CATH/INVASIVE PROCEDURES | Age: 31
Discharge: HOME | End: 2021-06-23
Payer: COMMERCIAL

## 2021-06-23 PROCEDURE — 93660 TILT TABLE EVALUATION: CPT | Performed by: INTERNAL MEDICINE

## 2021-06-23 NOTE — PROCEDURES
[facility]    Tilt Table Test Report                       Today's Date:  6/23/2021  Patient Name:  Suzan Daly  Date of admission: 6/23/2021  8:18 AM  Patient's age:  31 y.o., 1990  Admission Dx:  Syncope [R55]    Requesting Physician: No admitting provider for patient encounter. Procedures performed: Tilt table testing    Indication of the procedure:  Suzan Daly is a 31 y.o. male who presented with near syncope. Details of procedure: The procedure, the risks, benefits and alternatives of the procedure were explained to the patient. All questions were answered. Patient verbalized understanding and informed consent was obtained. The patient was brought to the electrophysiology lab in a fasting nonsedated state. Peripheral IV access was obtained and the patient was put on the tilt table. Initial vital signs at baseline:    BP: 122/69 mmHg. HR: 71 bpm.    Then the tilt table was raised to 70 degree. Immediately upon raising the table the vitals were:     BP: 120/74 mmHg. HR: 72 bpm.    After 20 minutes, the patient received 0.4 mg NTG sublingual.  1 minute after NTG, the patient felt symptoms of nauseous, looked pale and passed out , and the recorded vital signs were:    BP: 50/15 mmHg. HR: 54 bpm.      At this time, the patient experienced a syncopal episode. The Tilt table test was immediately brought back to [place] position and the patient received IV fluid bolus. Patient did  experience syncope during the tilt table test.     At the end of procedure:  BP: 106/55 mmHg. HR: 60 bpm.      The patient tolerated the procedure well and there were no complications. Conclusion:    positive neurocardiogenic syncope    Recommendations:    Recommended to avoid dehydration, paying close attention to any prodromal symptoms and how to avert syncope by lying down and elevating legs.    Patient also to have compression stocking and encouraged to use them when

## 2021-06-24 ENCOUNTER — TELEPHONE (OUTPATIENT)
Dept: PRIMARY CARE CLINIC | Age: 31
End: 2021-06-24

## 2021-06-30 ENCOUNTER — TELEPHONE (OUTPATIENT)
Dept: PRIMARY CARE CLINIC | Age: 31
End: 2021-06-30

## 2021-06-30 NOTE — TELEPHONE ENCOUNTER
pt girlfriend called in regards to temp disability and she wants a call a back eugene hutchison [phone]

## 2021-06-30 NOTE — TELEPHONE ENCOUNTER
Called GF back but not able to speak to her about any of these issues due to her not being on the HIPPA form. She stated that she would have Katheryn Deter call the office.

## 2021-07-01 NOTE — TELEPHONE ENCOUNTER
He would have to go through the [facility], I would call job and family services, once he has the paperwork he needs I can fill it out

## 2021-07-06 DIAGNOSIS — R52 BODY ACHES: Primary | Chronic | ICD-10-CM

## 2021-07-06 DIAGNOSIS — G90.A POTS (POSTURAL ORTHOSTATIC TACHYCARDIA SYNDROME): Primary | ICD-10-CM

## 2021-07-06 DIAGNOSIS — L23.7 POISON IVY DERMATITIS: ICD-10-CM

## 2021-07-06 RX ORDER — MELOXICAM 15 MG/1
15 TABLET ORAL DAILY PRN
Qty: 30 TABLET | Refills: 5 | Status: SHIPPED | OUTPATIENT
Start: 2021-07-06 | End: 2022-04-19

## 2021-07-06 RX ORDER — TRIAMCINOLONE ACETONIDE 1 MG/G
CREAM TOPICAL
Qty: 15 G | Refills: 0 | Status: SHIPPED | OUTPATIENT
Start: 2021-07-06 | End: 2021-07-20 | Stop reason: ALTCHOICE

## 2021-07-14 ENCOUNTER — OFFICE VISIT (OUTPATIENT)
Dept: PRIMARY CARE CLINIC | Age: 31
End: 2021-07-14
Payer: COMMERCIAL

## 2021-07-14 VITALS
SYSTOLIC BLOOD PRESSURE: 122 MMHG | DIASTOLIC BLOOD PRESSURE: 72 MMHG | BODY MASS INDEX: 27.26 KG/M2 | HEIGHT: 74 IN | HEART RATE: 86 BPM | OXYGEN SATURATION: 97 % | WEIGHT: 212.4 LBS

## 2021-07-14 DIAGNOSIS — F90.0 ATTENTION DEFICIT HYPERACTIVITY DISORDER (ADHD), PREDOMINANTLY INATTENTIVE TYPE: Chronic | ICD-10-CM

## 2021-07-14 DIAGNOSIS — F41.9 ANXIETY: Chronic | ICD-10-CM

## 2021-07-14 DIAGNOSIS — F32.1 MODERATE MAJOR DEPRESSION, SINGLE EPISODE (HCC): Chronic | ICD-10-CM

## 2021-07-14 DIAGNOSIS — R41.89 BRAIN FOG: Chronic | ICD-10-CM

## 2021-07-14 DIAGNOSIS — G90.A POTS (POSTURAL ORTHOSTATIC TACHYCARDIA SYNDROME): Primary | Chronic | ICD-10-CM

## 2021-07-14 PROCEDURE — 99214 OFFICE O/P EST MOD 30 MIN: CPT | Performed by: NURSE PRACTITIONER

## 2021-07-14 SDOH — ECONOMIC STABILITY: FOOD INSECURITY: WITHIN THE PAST 12 MONTHS, THE FOOD YOU BOUGHT JUST DIDN'T LAST AND YOU DIDN'T HAVE MONEY TO GET MORE.: NEVER TRUE

## 2021-07-14 SDOH — ECONOMIC STABILITY: FOOD INSECURITY: WITHIN THE PAST 12 MONTHS, YOU WORRIED THAT YOUR FOOD WOULD RUN OUT BEFORE YOU GOT MONEY TO BUY MORE.: NEVER TRUE

## 2021-07-14 ASSESSMENT — SOCIAL DETERMINANTS OF HEALTH (SDOH): HOW HARD IS IT FOR YOU TO PAY FOR THE VERY BASICS LIKE FOOD, HOUSING, MEDICAL CARE, AND HEATING?: NOT HARD AT ALL

## 2021-07-14 NOTE — PROGRESS NOTES
Davi Santana  MEDICINE  [address] [place] [address]  Dept: [phone]       Chris Borrero is a 27 y.o. male who presents to the office today for evaluation of Fatigue, Depression, and Joint Pain    Pt is here for f/u. He is seeing psychiatry who is managing his anxiety/depression/ADHD. He is also seeing a naturopathic Dr. He had a tilt table test done recently that confirmed POTS, he has a an appt with cardiology to discuss treatment options on 21. He is taking mobic for joint pain and body aches. He was let go from his job, they worded it as a \"medical termination\". He is looking into getting long term disability and unemployment. At this time due to ongoing symptoms that still are not resolved (brain fog, fatigue, weakness, decreased focus/concentration), he feels unable to work at this time. He will continue to follow with his psychiatrist as well as his cardiologist and will see me every 3 months and as needed, pt understands and agreeable to plan. Patient Active Problem List    Diagnosis Date Noted    POTS (postural orthostatic tachycardia syndrome) 2021    Elevated fasting glucose 2021    Body aches 2021    Chronic tension-type headache, not intractable 2021    Tremor of both hands 2021    Brain fog 2021    Chronic diarrhea of unknown origin 2021    Gastroesophageal reflux disease 2021    Primary insomnia 2021    Hypovitaminosis D 2021    Moderate major depression, single episode ([facility] [address] 2021    Anxiety 2021    ADD (attention deficit disorder) 09/15/2016     Patient's BMI is Body mass index is 27.27 kg/m².    Social History     Tobacco Use    Smoking status: Former Smoker     Packs/day: 0.25     Years: 2.00     Pack years: 0.50     Quit date: 2016     Years since quittin.4    Smokeless tobacco: Former User   Vaping Use    Vaping Use: Never used   Substance Use Topics    Alcohol use: No    Drug use: Never      Past Medical History:   Diagnosis Date    ADD (attention deficit disorder) 9/15/2016    Anxiety     Depression       No data recorded   Immunization:  Immunization History   Administered Date(s) Administered    Tdap (Boostrix, Adacel) 06/27/2008    Varicella (Varivax) 06/27/1990, 06/27/1993     Past Surgical History:   Procedure Laterality Date    FRACTURE SURGERY Right     age 7     Family History   Problem Relation Age of Onset    Diabetes Mother     Depression Mother     Cancer Sister 28        neck and throat cancer    Depression Sister     Esophageal Cancer Sister      Current Outpatient Medications   Medication Sig Dispense Refill    meloxicam (MOBIC) 15 MG tablet Take 1 tablet by mouth daily as needed for Pain 30 tablet 5    escitalopram (LEXAPRO) 10 MG tablet Take 1 tablet by mouth daily      triamcinolone (KENALOG) 0.1 % cream Apply topically 2 times daily. (Patient not taking: Reported on 7/14/2021) 15 g 0    dicyclomine (BENTYL) 10 MG capsule Take 1 capsule by mouth 4 times daily (Patient not taking: Reported on 7/14/2021) 120 capsule 3     No current facility-administered medications for this visit. The patient's past medical, surgical, social, and family history as well as his current medications and allergies were reviewed as documented in today's encounter. Review of Systems   Constitutional: Positive for fatigue. HENT: Negative. Eyes: Negative. Respiratory: Negative. Cardiovascular: Negative. Gastrointestinal: Negative. Endocrine: Negative. Genitourinary: Negative. Musculoskeletal: Positive for myalgias. Skin: Negative. Allergic/Immunologic: Negative. Neurological: Positive for dizziness and weakness. Hematological: Negative. Psychiatric/Behavioral: Positive for decreased concentration. The patient is nervous/anxious. Depression   All other systems reviewed and are negative.      BP 122/72   Pulse 86   Ht 6' 2\" (1.88 m)   Wt 212 lb 6.4 oz (96.3 kg)   SpO2 97%   BMI 27.27 kg/m²   Physical Exam  Vitals and nursing note reviewed. Constitutional:       Appearance: Normal appearance. He is normal weight. HENT:      Right Ear: Tympanic membrane, ear canal and external ear normal.      Left Ear: Tympanic membrane, ear canal and external ear normal.      Nose: Nose normal.      Mouth/Throat:      Mouth: Mucous membranes are moist.      Pharynx: Oropharynx is clear. Eyes:      Extraocular Movements: Extraocular movements intact. Conjunctiva/sclera: Conjunctivae normal.      Pupils: Pupils are equal, round, and reactive to light. Cardiovascular:      Rate and Rhythm: Normal rate and regular rhythm. Pulses: Normal pulses. Heart sounds: Normal heart sounds. Pulmonary:      Effort: Pulmonary effort is normal.      Breath sounds: Normal breath sounds. Abdominal:      General: Abdomen is flat. Bowel sounds are normal.      Palpations: Abdomen is soft. Musculoskeletal:      Cervical back: Normal range of motion. Skin:     General: Skin is warm and dry. Capillary Refill: Capillary refill takes less than 2 seconds. Neurological:      General: No focal deficit present. Mental Status: He is alert. Psychiatric:         Mood and Affect: Mood normal.         Behavior: Behavior normal.         Thought Content:  Thought content normal.         Judgment: Judgment normal.       Lab Results   Component Value Date    WBC 6.0 05/20/2021    HGB 15.5 05/20/2021    HCT 47.8 05/20/2021    MCV 91.2 05/20/2021     05/20/2021     Lab Results   Component Value Date     05/20/2021    K 4.4 05/20/2021     05/20/2021    CO2 26 05/20/2021    BUN 12 05/20/2021    CREATININE 0.89 05/20/2021    GLUCOSE 96 05/20/2021    CALCIUM 9.5 05/20/2021      Lab Results   Component Value Date    ALT 18 05/20/2021    AST 16 05/20/2021    ALKPHOS 71 05/20/2021    BILITOT 0.62 05/20/2021     Lab Results   Component Value Date    TSH 1.27 06/16/2021     Lab Results   Component Value Date    CHOL 160 01/13/2021     Lab Results   Component Value Date    TRIG 89 01/13/2021     Lab Results   Component Value Date    HDL 44 01/13/2021     Lab Results   Component Value Date    LDLCHOLESTEROL 98 01/13/2021     Lab Results   Component Value Date    CHOLHDLRATIO 3.6 01/13/2021     Lab Results   Component Value Date    LABA1C 5.6 06/16/2021     Lab Results   Component Value Date    CNJRKTLJ55 492 06/16/2021     Lab Results   Component Value Date    FOLATE 16.7 06/16/2021     Lab Results   Component Value Date    IRON 115 06/16/2021    TIBC 281 06/16/2021    FERRITIN 113 06/16/2021     Lab Results   Component Value Date    VITD25 38.2 05/20/2021     I personally reviewed testing with patient. Otherwise labs within normal limits  ASSESSMENT AND PLAN  1. POTS (postural orthostatic tachycardia syndrome)      2. Moderate major depression, single episode ([address]      3. Anxiety      4. Attention deficit hyperactivity disorder (ADHD), predominantly inattentive type      5. Brain fog       No orders of the defined types were placed in this encounter. No orders of the defined types were placed in this encounter. Data Unavailable  There are no preventive care reminders to display for this patient. There are no discontinued medications. The patient's past medical, surgical, social, and family history as well as his   current medications and allergies were reviewed as documented in today's encounter. Medications, labs, diagnostic studies, consultations and follow-up as documented in this encounter. Return in about 3 months (around 10/14/2021).   Future Appointments   Date Time Provider David Gupta   7/20/2021  9:00 AM MD TANG Oreilly   10/14/2021 11:00 AM Ruthanne Lennox, APRN - [facility]     This note was completed by using the assistance of a speech-recognition program. However, inadvertent computerized transcription errors may be present. Although every effort was made to ensure accuracy, no guarantees can be provided that every mistake has been identified and corrected by editing.   Electronically signed by FLAKO Brewster CNP on 7/15/2021 at 10:21 AM

## 2021-07-15 PROBLEM — G90.A POTS (POSTURAL ORTHOSTATIC TACHYCARDIA SYNDROME): Chronic | Status: ACTIVE | Noted: 2021-07-06

## 2021-07-19 DIAGNOSIS — F41.9 ANXIETY: Primary | ICD-10-CM

## 2021-07-19 RX ORDER — HYDROXYZINE HYDROCHLORIDE 25 MG/1
25 TABLET, FILM COATED ORAL EVERY 8 HOURS PRN
Qty: 30 TABLET | Refills: 5 | Status: SHIPPED | OUTPATIENT
Start: 2021-07-19 | End: 2021-08-20 | Stop reason: SDUPTHER

## 2021-08-04 ENCOUNTER — TELEPHONE (OUTPATIENT)
Dept: PRIMARY CARE CLINIC | Age: 31
End: 2021-08-04

## 2021-08-04 NOTE — TELEPHONE ENCOUNTER
Dr. Clari Logan from [facility] calling to speak with Chaz Frias in regards to [place]'s long term disability claim. He said that she can reach him at his direct line #[phone] to speak with him but he also said he will be faxing over some paperwork for Arabella Villafana to fill out if she would rather respond that way.

## 2021-08-11 ENCOUNTER — TELEPHONE (OUTPATIENT)
Dept: PRIMARY CARE CLINIC | Age: 31
End: 2021-08-11

## 2021-08-11 DIAGNOSIS — G90.A POTS (POSTURAL ORTHOSTATIC TACHYCARDIA SYNDROME): Primary | ICD-10-CM

## 2021-08-11 NOTE — TELEPHONE ENCOUNTER
Wanting to know if he could get a referral for Dr. Steffen Herrera who is a specialist for C[facility] Syndrome.

## 2021-08-17 ENCOUNTER — TELEPHONE (OUTPATIENT)
Dept: PRIMARY CARE CLINIC | Age: 31
End: 2021-08-17

## 2021-08-20 DIAGNOSIS — F41.9 ANXIETY: ICD-10-CM

## 2021-08-20 RX ORDER — HYDROXYZINE HYDROCHLORIDE 25 MG/1
25 TABLET, FILM COATED ORAL NIGHTLY
Qty: 30 TABLET | Refills: 5 | Status: SHIPPED | OUTPATIENT
Start: 2021-08-20 | End: 2021-09-19

## 2021-08-20 RX ORDER — LORATADINE 10 MG/1
10 TABLET ORAL DAILY
Qty: 30 TABLET | Refills: 5 | Status: SHIPPED | OUTPATIENT
Start: 2021-08-20 | End: 2022-04-19

## 2021-08-30 DIAGNOSIS — F32.1 MODERATE MAJOR DEPRESSION, SINGLE EPISODE (HCC): Primary | Chronic | ICD-10-CM

## 2021-08-30 RX ORDER — BUPROPION HYDROCHLORIDE 150 MG/1
150 TABLET ORAL EVERY MORNING
Qty: 30 TABLET | Refills: 5 | Status: SHIPPED | OUTPATIENT
Start: 2021-08-30 | End: 2022-04-19

## 2021-08-30 RX ORDER — BUPROPION HYDROCHLORIDE 150 MG/1
150 TABLET ORAL EVERY MORNING
COMMUNITY
End: 2021-08-30 | Stop reason: SDUPTHER

## 2021-09-02 DIAGNOSIS — K21.00 GASTROESOPHAGEAL REFLUX DISEASE WITH ESOPHAGITIS WITHOUT HEMORRHAGE: Primary | Chronic | ICD-10-CM

## 2021-09-02 DIAGNOSIS — K52.9 CHRONIC DIARRHEA OF UNKNOWN ORIGIN: Chronic | ICD-10-CM

## 2022-04-19 ENCOUNTER — OFFICE VISIT (OUTPATIENT)
Dept: FAMILY MEDICINE CLINIC | Age: 32
End: 2022-04-19
Payer: COMMERCIAL

## 2022-04-19 VITALS
SYSTOLIC BLOOD PRESSURE: 105 MMHG | DIASTOLIC BLOOD PRESSURE: 58 MMHG | HEART RATE: 79 BPM | WEIGHT: 200 LBS | BODY MASS INDEX: 25.68 KG/M2 | OXYGEN SATURATION: 96 %

## 2022-04-19 DIAGNOSIS — F41.9 ANXIETY: Primary | Chronic | ICD-10-CM

## 2022-04-19 DIAGNOSIS — F51.01 PRIMARY INSOMNIA: Chronic | ICD-10-CM

## 2022-04-19 PROCEDURE — 99213 OFFICE O/P EST LOW 20 MIN: CPT

## 2022-04-19 RX ORDER — HYDROXYZINE HYDROCHLORIDE 25 MG/1
25 TABLET, FILM COATED ORAL 3 TIMES DAILY PRN
COMMUNITY
End: 2022-10-28 | Stop reason: SDUPTHER

## 2022-04-19 RX ORDER — PROPRANOLOL HYDROCHLORIDE 20 MG/1
20 TABLET ORAL
COMMUNITY
Start: 2022-03-16

## 2022-04-19 RX ORDER — BUSPIRONE HYDROCHLORIDE 5 MG/1
5 TABLET ORAL 2 TIMES DAILY
Qty: 60 TABLET | Refills: 0 | Status: SHIPPED | OUTPATIENT
Start: 2022-04-19 | End: 2022-05-19

## 2022-04-19 ASSESSMENT — ENCOUNTER SYMPTOMS
CONSTIPATION: 0
SINUS PAIN: 0
SINUS PRESSURE: 0
ABDOMINAL PAIN: 0
BACK PAIN: 0
RHINORRHEA: 0
CHEST TIGHTNESS: 0
COUGH: 0
SHORTNESS OF BREATH: 0
COLOR CHANGE: 0
DIARRHEA: 0
WHEEZING: 0
NAUSEA: 0
EYE DISCHARGE: 0
EYE ITCHING: 0

## 2022-04-19 NOTE — PATIENT INSTRUCTIONS
Patient Education        buspirone  Pronunciation: emma DAGOBERTO weiss  Brand: BuSpar  What is the most important information I should know about buspirone? Do not use this medicine if you have used an MAO inhibitor in the past 14 days, such as isocarboxazid, linezolid, methylene blue injection, phenelzine,rasagiline, selegiline, or tranylcypromine. What is buspirone? Buspirone is used to treat symptoms of anxiety, such as fear, tension,irritability, dizziness, pounding heartbeat, and other physical symptoms. Buspirone is not an anti-psychotic medication and should not be used in place of medication prescribed by your doctor [facility]. Buspirone may also be used for purposes not listed in this medication guide. What should I discuss with my healthcare provider before taking buspirone? You should not use buspirone if you are allergic to it. Do not use buspirone if you have used an MAO inhibitor in the past 14 days. A dangerous drug interaction could occur. MAO inhibitors include isocarboxazid, linezolid, methylene blue injection, phenelzine, rasagiline,selegiline, tranylcypromine, and others. You may need to wait 14 days after stopping buspirone before you can take an MAOI. Tell your doctor if you have ever had:   kidney disease; or   liver disease. Be sure your doctor knows if you also take stimulant medicine, opioid medicine, herbal products, or medicine for depression, mental illness, Parkinson's disease, migraine headaches, serious infections, or prevention of nausea and vomiting. These medicines may interact with buspirone and cause a serious condition called serotonin syndrome. Tell your doctor if you are pregnant. You should not breastfeed while using buspirone. Do not give this medicine to a child without medical advice. How should I take buspirone? Follow all directions on your prescription label and read all medication guides or instruction sheets.  Your doctor may occasionally change your dose. Use themedicine exactly as directed. You may take buspirone with or without food but take it the same way each time. If you have switched to buspirone from another anxiety medication, you may need to slowly decrease your dose of the other medication rather than stopping suddenly. Some anxiety medications can cause withdrawal symptoms whenyou stop taking them suddenly after long-term use. Read and carefully follow any Instructions for Use provided with your medicine. Ask your doctor or pharmacist if you do not understand these instructions. Some buspirone tablets are scored so you can break the tablet into 2 or 3 pieces in order to take a smaller dose. Do not use a buspirone tablet if it has not been broken correctly and the piece is too big or too small. Follow yourdoctor's instructions about how much of the tablet to take. Buspirone can cause false positive results with certain medical tests. You may need to stop using the medicine for at least 48 hours before your test. [facility] doctor who treats you that you are using buspirone. Store at room temperature away from moisture, heat, and light. What happens if I miss a dose? Take the medicine as soon as you can, but skip the missed dose if it is almost time for your next dose. Do not take two doses at one time. What happens if I overdose? Seek emergency medical attention or call the Poison Help line at [phone]. What should I avoid while taking buspirone? Avoid driving or hazardous activity until you know how this medicine willaffect you. Your reactions could be impaired. Drinking alcohol may increase certain side effects of buspirone. Grapefruit may interact with buspirone and lead to unwanted side effects. Avoid the use of grapefruit products. What are the possible side effects of buspirone?   Get emergency medical help if you have signs of an allergic reaction: hives; difficult breathing; swelling of your face, lips, tongue, or throat. Call your doctor at once if you have:   chest pain;   shortness of breath; or   a light-headed feeling, like you might pass out. Seek medical attention right away if you have symptoms of serotonin syndrome, such as: agitation, hallucinations, fever, sweating, shivering, fast heart rate, musclestiffness, twitching, loss of coordination, nausea, vomiting, or diarrhea. Common side effects may include:   headache;   dizziness, feeling light-headed;   nausea; or   feeling nervous or excited. This is not a complete list of side effects and others may occur. Call your doctor for medical advice about side effects. You may report side effects toFDA at 0-819-KDY-5488. What other drugs will affect buspirone? Using buspirone with other drugs that make you drowsy or slow your breathing can worsen these effects. Ask your doctor before using opioid medication, asleeping pill, a muscle relaxer, or medicine for anxiety or seizures. Tell your doctor about all your current medicines. Many drugs can affect buspirone, especially:   nefazodone;   Ivalee's wort;   an antibiotic --clarithromycin, erythromycin, rifabutin, rifampin, rifapentine, telithromycin;   antifungal medicine --itraconazole, ketoconazole;   antiviral medicine to treat HIV/AIDS --efavirenz, indinavir, nelfinavir, nevirapine, ritonavir, saquinavir;   cancer medicine --apalutamide, enzalutamide, mitotane;   heart or blood pressure medicines --diltiazem, verapamil;   seizure medicine --carbamazepine, oxcarbazepine, phenytoin, primidone; or   steroid medicine --dexamethasone, prednisone. This list is not complete and many other drugs may affect buspirone. This includes prescription and over-the-counter medicines, vitamins, andherbal products. Not all possible drug interactions are listed here. Where can I get more information? Your pharmacist can provide more information about buspirone.   Remember, keep this and all other medicines out of the reach of children, never share your medicines with others, and use this medication only for the indication prescribed. Every effort has been made to ensure that the information provided by [address] is accurate, up-to-date, and complete, but no guarantee is made to that effect. Drug information contained herein may be time sensitive. [facility] information has been compiled for use by healthcare practitioners and consumers in the [place] and therefore [facility] does not warrant that uses outside of the [place] are appropriate, unless specifically indicated otherwise. [facility]'s drug information does not endorse drugs, diagnose patients or recommend therapy. [facility]'s drug information is an informational resource designed to assist licensed healthcare practitioners in caring for their patients and/or to serve consumers viewing this service as a supplement to, and not a substitute for, the expertise, skill, knowledge and judgment of healthcare practitioners. The absence of a warning for a given drug or drug combination in no way should be construed to indicate that the drug or drug combination is safe, effective or appropriate for any given patient. [facility] does not assume any responsibility for any aspect of healthcare administered with the aid of information [facility] provides. The information contained herein is not intended to cover all possible uses, directions, precautions, warnings, drug interactions, allergic reactions, or adverse effects. If you have questions about the drugs you are taking, check with yourdoctor, nurse or pharmacist.  Copyright 5958-2673 [address]. Version: 6.01. Revision date: 2/24/2020. Care instructions adapted under license by [facility] ([facility]). If you have questions about a medical condition or this instruction, always ask your healthcare professional. [place] [address] any warranty or liability for your use of this information.

## 2022-04-19 NOTE — PROGRESS NOTES
Kye Richards (:  1990) is a 27 y.o. male,Established patient, here for evaluation of the following chief complaint(s):  Established New Doctor (patient is here today to establish)         ASSESSMENT/PLAN:  1. Anxiety  Assessment & Plan:   Start Buspar 5mg oral tab twice a day. SE discussed, if symptoms of anxiety worsen call office, if become suicidal go to ER. 2. Primary insomnia  Assessment & Plan:   Uncontrolled, changes made today: add, buspar to help with racing thoughts. Discussed sleep hygen, and other nonhabit forming medications such as melatonin. Return in about 1 month (around 2022), or if symptoms worsen or fail to improve. Subjective   SUBJECTIVE/OBJECTIVE:  Rachelle Romero is here today to establish with PCP. All past portent notes reviewed today at apt. Rachelle Romero has been suffering from POTS and sees Doctor Kinjal Rendon at [facility]. His condition is stable at this time, does have diahrrea and has seen gastro for this. Currently taking probiotic which has been helpful. Has trouble falling and staying asleep. Has used ambien in the past but he does not wake feeling rested. Currently is taking atarax 25mg at night on occasion to help sleep. Would like something other then this because it makes him sleepy during the day. Melatonin and sleep hygen discussed. He tells me melatonin even in small doses make him groggy in the mornings. He goes on to say his racing thoughts make it hard for him to sleep, and would like something for this during the day. Buspar 5mg oral twice a day discussed, patient willing to try this. Come back in a month to reassess. Review of Systems   Constitutional: Negative for chills, fatigue, fever and unexpected weight change. HENT: Negative for congestion, ear pain, rhinorrhea, sinus pressure and sinus pain. Eyes: Negative for discharge, itching and visual disturbance. Respiratory: Negative for cough, chest tightness, shortness of breath and wheezing. Cardiovascular: Negative for chest pain, palpitations and leg swelling. Gastrointestinal: Negative for abdominal pain, constipation, diarrhea and nausea. Endocrine: Negative for cold intolerance, heat intolerance, polydipsia and polyphagia. Genitourinary: Negative for dysuria, flank pain and frequency. Musculoskeletal: Negative for arthralgias, back pain and myalgias. Skin: Negative for color change. Allergic/Immunologic: Negative for environmental allergies and food allergies. Neurological: Negative for dizziness, weakness, light-headedness, numbness and headaches. Psychiatric/Behavioral: Negative for agitation, behavioral problems and confusion. The patient is not nervous/anxious. Objective   Physical Exam  Vitals and nursing note reviewed. Constitutional:       General: He is not in acute distress. Appearance: Normal appearance. He is not ill-appearing. HENT:      Head: Normocephalic and atraumatic. Right Ear: Tympanic membrane and external ear normal.      Left Ear: Tympanic membrane and external ear normal.      Nose: Nose normal. No congestion or rhinorrhea. Mouth/Throat:      Mouth: Mucous membranes are moist.      Pharynx: Oropharynx is clear. No posterior oropharyngeal erythema. Eyes:      Pupils: Pupils are equal, round, and reactive to light. Cardiovascular:      Rate and Rhythm: Normal rate and regular rhythm. Pulses: Normal pulses. Heart sounds: Normal heart sounds. Pulmonary:      Effort: Pulmonary effort is normal.      Breath sounds: Normal breath sounds. No wheezing or rhonchi. Abdominal:      General: Bowel sounds are normal.      Palpations: There is no mass. Tenderness: There is no abdominal tenderness. Musculoskeletal:         General: No swelling or tenderness. Normal range of motion. Cervical back: Normal range of motion. No rigidity or tenderness. Skin:     General: Skin is warm and dry.       Capillary Refill: Capillary refill takes less than 2 seconds. Coloration: Skin is not pale. Findings: No erythema. Neurological:      General: No focal deficit present. Mental Status: He is alert and oriented to person, place, and time. Psychiatric:         Mood and Affect: Mood normal.         Behavior: Behavior normal.         Thought Content: Thought content normal.         Judgment: Judgment normal.                  An electronic signature was used to authenticate this note.     --FLAKO Arroyo - CNP

## 2022-08-16 ENCOUNTER — OFFICE VISIT (OUTPATIENT)
Dept: FAMILY MEDICINE CLINIC | Age: 32
End: 2022-08-16
Payer: COMMERCIAL

## 2022-08-16 VITALS
WEIGHT: 196 LBS | DIASTOLIC BLOOD PRESSURE: 56 MMHG | SYSTOLIC BLOOD PRESSURE: 104 MMHG | OXYGEN SATURATION: 98 % | HEART RATE: 67 BPM | BODY MASS INDEX: 25.16 KG/M2

## 2022-08-16 DIAGNOSIS — Z13.220 LIPID SCREENING: ICD-10-CM

## 2022-08-16 DIAGNOSIS — R73.01 ELEVATED FASTING GLUCOSE: Primary | ICD-10-CM

## 2022-08-16 DIAGNOSIS — R00.0 TACHYCARDIA: ICD-10-CM

## 2022-08-16 DIAGNOSIS — R41.89 BRAIN FOG: ICD-10-CM

## 2022-08-16 DIAGNOSIS — R53.83 FATIGUE, UNSPECIFIED TYPE: ICD-10-CM

## 2022-08-16 DIAGNOSIS — G90.A POTS (POSTURAL ORTHOSTATIC TACHYCARDIA SYNDROME): ICD-10-CM

## 2022-08-16 LAB
ALBUMIN/GLOBULIN RATIO: 1.7 G/DL
ALBUMIN: 4.7 G/DL (ref 3.5–5)
ALP BLD-CCNC: 68 UNITS/L (ref 38–126)
ALT SERPL-CCNC: 22 UNITS/L (ref 4–50)
ANION GAP SERPL CALCULATED.3IONS-SCNC: 7.3 MMOL/L
AST SERPL-CCNC: 19 UNITS/L (ref 17–59)
BASOPHILS %: 1.75 (ref 0–3)
BASOPHILS ABSOLUTE: 0.1 (ref 0–0.3)
BILIRUB SERPL-MCNC: 0.8 MG/DL (ref 0.2–1.3)
BUN BLDV-MCNC: 15 MG/DL (ref 9–20)
CALCIUM SERPL-MCNC: 9.9 MG/DL (ref 8.4–10.2)
CHLORIDE BLD-SCNC: 104 MMOL/L (ref 98–120)
CHOLESTEROL/HDL RATIO: 3.63 RATIO (ref 0–4.5)
CHOLESTEROL: 178 MG/DL (ref 50–200)
CO2: 29 MMOL/L (ref 22–31)
CREAT SERPL-MCNC: 1 MG/DL (ref 0.7–1.3)
EOSINOPHILS %: 1.55 (ref 0–10)
EOSINOPHILS ABSOLUTE: 0.09 (ref 0–1.1)
GFR CALCULATED: > 60
GLOBULIN: 2.8 G/DL
GLUCOSE: 96 MG/DL (ref 75–110)
HBA1C MFR BLD: 5.3 %
HCT VFR BLD CALC: 53.4 % (ref 42–52)
HDLC SERPL-MCNC: 49 MG/DL (ref 36–68)
HEMOGLOBIN: 16.1 (ref 13.8–17.8)
IRON SATURATION: 34 % (ref 15–38)
IRON: 107 MG/DL (ref 49–181)
LDL CHOLESTEROL CALCULATED: 112 MG/DL (ref 0–160)
LYMPHOCYTE %: 41.24 (ref 20–51.1)
LYMPHOCYTES ABSOLUTE: 2.32 (ref 1–5.5)
MCH RBC QN AUTO: 29.1 PG (ref 28.5–32.5)
MCHC RBC AUTO-ENTMCNC: 30.1 G/DL (ref 32–37)
MCV RBC AUTO: 96.8 FL (ref 80–94)
MONOCYTES %: 10.65 (ref 1.7–9.3)
MONOCYTES ABSOLUTE: 0.6 (ref 0.1–1)
NEUTROPHILS %: 44.82 (ref 42.2–75.2)
NEUTROPHILS ABSOLUTE: 2.52 (ref 2–8.1)
PDW BLD-RTO: 12.2 % (ref 10–15.5)
PLATELET # BLD: 271.3 THOU/MM3 (ref 130–400)
POTASSIUM SERPL-SCNC: 4.4 MMOL/L (ref 3.6–5)
RBC: 5.52 M/UL (ref 4.7–6.1)
SODIUM BLD-SCNC: 140 MMOL/L (ref 135–145)
TOTAL IRON BINDING CAPACITY: 315 MG/DL (ref 261–497)
TOTAL PROTEIN, SERUM: 7.4 G/DL (ref 6.3–8.2)
TRIGL SERPL-MCNC: 85 MG/DL (ref 10–250)
TSH REFLEX FT4: 1.87 MIU/ML (ref 0.49–4.67)
VLDLC SERPL CALC-MCNC: 17 MG/DL (ref 0–50)
WBC: 5.6 THOU/ML3 (ref 4.8–10.8)

## 2022-08-16 PROCEDURE — 99214 OFFICE O/P EST MOD 30 MIN: CPT

## 2022-08-16 PROCEDURE — 83036 HEMOGLOBIN GLYCOSYLATED A1C: CPT

## 2022-08-16 ASSESSMENT — PATIENT HEALTH QUESTIONNAIRE - PHQ9
2. FEELING DOWN, DEPRESSED OR HOPELESS: 1
SUM OF ALL RESPONSES TO PHQ QUESTIONS 1-9: 3
SUM OF ALL RESPONSES TO PHQ QUESTIONS 1-9: 3
10. IF YOU CHECKED OFF ANY PROBLEMS, HOW DIFFICULT HAVE THESE PROBLEMS MADE IT FOR YOU TO DO YOUR WORK, TAKE CARE OF THINGS AT HOME, OR GET ALONG WITH OTHER PEOPLE: 1
4. FEELING TIRED OR HAVING LITTLE ENERGY: 1
1. LITTLE INTEREST OR PLEASURE IN DOING THINGS: 1
7. TROUBLE CONCENTRATING ON THINGS, SUCH AS READING THE NEWSPAPER OR WATCHING TELEVISION: 0
SUM OF ALL RESPONSES TO PHQ9 QUESTIONS 1 & 2: 2
SUM OF ALL RESPONSES TO PHQ QUESTIONS 1-9: 3
5. POOR APPETITE OR OVEREATING: 0
6. FEELING BAD ABOUT YOURSELF - OR THAT YOU ARE A FAILURE OR HAVE LET YOURSELF OR YOUR FAMILY DOWN: 0
8. MOVING OR SPEAKING SO SLOWLY THAT OTHER PEOPLE COULD HAVE NOTICED. OR THE OPPOSITE, BEING SO FIGETY OR RESTLESS THAT YOU HAVE BEEN MOVING AROUND A LOT MORE THAN USUAL: 0
9. THOUGHTS THAT YOU WOULD BE BETTER OFF DEAD, OR OF HURTING YOURSELF: 0
SUM OF ALL RESPONSES TO PHQ QUESTIONS 1-9: 3
3. TROUBLE FALLING OR STAYING ASLEEP: 0

## 2022-08-16 ASSESSMENT — ENCOUNTER SYMPTOMS
SINUS PAIN: 0
SINUS PRESSURE: 0
EYE PAIN: 0
EYE REDNESS: 0
ABDOMINAL DISTENTION: 1
ANAL BLEEDING: 0
COLOR CHANGE: 0
ABDOMINAL PAIN: 0
SHORTNESS OF BREATH: 0
BLOOD IN STOOL: 0
COUGH: 0
CHEST TIGHTNESS: 0
NAUSEA: 1

## 2022-08-16 NOTE — PROGRESS NOTES
without any blood discrepancies that would make me believe he had anemia at that time. However he tells me his diet has substantially changed over the last year and he no longer really eats red meat at all. With this being the case and his multitude of symptoms I feel it is reasonable to check a CBC to ensure he is not anemic or having an iron deficiency. I will also look at his electrolytes, kidney function, liver enzymes. A1c in office today is within normal limits. Labs are ordered, Dennis Cavazos is agreeable to getting these labs drawn today as he has not eaten in the last 12 hours. Office will call him with these results. We will send him to allergy specialist due to multitude of symptoms around eating. This could represent an a food allergy, and this is discussed at length. He is to cut dairy out as well to see if that makes an improvement. Dennis Cavazos denies any further questions, is agreeable to this plan of care. Fatigue  This is a chronic problem. The current episode started more than 1 year ago. The problem occurs constantly. The problem has been unchanged. Associated symptoms include fatigue and nausea. Pertinent negatives include no abdominal pain, arthralgias, chest pain, chills, congestion, coughing, fever, headaches or numbness. The symptoms are aggravated by eating. He has tried nothing for the symptoms. The treatment provided no relief. Review of Systems   Constitutional:  Positive for fatigue. Negative for activity change, chills and fever. HENT:  Negative for congestion, mouth sores, sinus pressure and sinus pain. Eyes:  Negative for pain and redness. Respiratory:  Negative for cough, chest tightness and shortness of breath. Cardiovascular:  Negative for chest pain, palpitations and leg swelling. Gastrointestinal:  Positive for abdominal distention and nausea. Negative for abdominal pain, anal bleeding and blood in stool.    Endocrine: Negative for cold intolerance, heat intolerance, polyphagia and polyuria. Genitourinary:  Negative for difficulty urinating, dysuria, hematuria and urgency. Musculoskeletal:  Negative for arthralgias and gait problem. Skin:  Negative for color change. Neurological:  Positive for dizziness. Negative for syncope, light-headedness, numbness and headaches. Psychiatric/Behavioral:  Negative for agitation. Objective   Physical Exam  Constitutional:       General: He is not in acute distress. Appearance: Normal appearance. HENT:      Head: Normocephalic and atraumatic. Right Ear: External ear normal.      Left Ear: External ear normal.      Nose: Nose normal. No congestion. Mouth/Throat:      Mouth: Mucous membranes are moist.      Pharynx: Oropharynx is clear. Eyes:      Pupils: Pupils are equal, round, and reactive to light. Cardiovascular:      Rate and Rhythm: Normal rate and regular rhythm. Pulses: Normal pulses. Heart sounds: Normal heart sounds. Pulmonary:      Effort: Pulmonary effort is normal.      Breath sounds: Normal breath sounds. No wheezing or rhonchi. Abdominal:      General: Bowel sounds are normal. There is no distension. Tenderness: There is no abdominal tenderness. Musculoskeletal:         General: Normal range of motion. Cervical back: Normal range of motion. Skin:     General: Skin is warm and dry. Capillary Refill: Capillary refill takes less than 2 seconds. Neurological:      General: No focal deficit present. Mental Status: He is alert. Psychiatric:         Mood and Affect: Mood normal.                An electronic signature was used to authenticate this note.     --FLAKO Rowe - CNP

## 2022-08-16 NOTE — ASSESSMENT & PLAN NOTE
Uncontrolled, continue current medications and continue current treatment plan will check labs, continue current medications. Multitude of symptoms could be POTS exacerbation, does not have any near syncopal episodes.

## 2022-10-28 RX ORDER — HYDROXYZINE HYDROCHLORIDE 25 MG/1
TABLET, FILM COATED ORAL
Qty: 30 TABLET | Refills: 1 | Status: SHIPPED | OUTPATIENT
Start: 2022-10-28

## 2022-10-28 NOTE — TELEPHONE ENCOUNTER
Dagoberto Bernard is requesting a refill on the following medication(s):  Requested Prescriptions     Pending Prescriptions Disp Refills    hydrOXYzine HCl (ATARAX) 25 MG tablet       Sig: Take 1 tablet by mouth 3 times daily as needed       Last Visit Date (If Applicable):  6/14/5890    Next Visit Date:    Visit date not found

## 2023-01-16 ENCOUNTER — HOSPITAL ENCOUNTER (OUTPATIENT)
Age: 33
Setting detail: SPECIMEN
Discharge: HOME | End: 2023-01-16
Payer: COMMERCIAL

## 2023-01-16 ENCOUNTER — OFFICE VISIT (OUTPATIENT)
Dept: FAMILY MEDICINE CLINIC | Age: 33
End: 2023-01-16
Payer: COMMERCIAL

## 2023-01-16 VITALS
DIASTOLIC BLOOD PRESSURE: 74 MMHG | BODY MASS INDEX: 25.67 KG/M2 | WEIGHT: 200 LBS | OXYGEN SATURATION: 99 % | SYSTOLIC BLOOD PRESSURE: 110 MMHG | HEIGHT: 74 IN | HEART RATE: 81 BPM

## 2023-01-16 DIAGNOSIS — R36.9 DISCHARGE FROM PENIS: Primary | ICD-10-CM

## 2023-01-16 DIAGNOSIS — R36.9 DISCHARGE FROM PENIS: ICD-10-CM

## 2023-01-16 LAB
BILIRUBIN, POC: NORMAL
BLOOD URINE, POC: NORMAL
CLARITY, POC: NORMAL
COLOR, POC: YELLOW
GLUCOSE URINE, POC: NORMAL
KETONES, POC: NORMAL
LEUKOCYTE EST, POC: NORMAL
NITRITE, POC: NORMAL
PH, POC: 7
PROTEIN, POC: NORMAL
SPECIFIC GRAVITY, POC: 1.03
UROBILINOGEN, POC: 0.2

## 2023-01-16 PROCEDURE — 87491 CHLMYD TRACH DNA AMP PROBE: CPT

## 2023-01-16 PROCEDURE — 87591 N.GONORRHOEAE DNA AMP PROB: CPT

## 2023-01-16 PROCEDURE — 99213 OFFICE O/P EST LOW 20 MIN: CPT

## 2023-01-16 PROCEDURE — 81002 URINALYSIS NONAUTO W/O SCOPE: CPT

## 2023-01-16 SDOH — ECONOMIC STABILITY: FOOD INSECURITY: WITHIN THE PAST 12 MONTHS, YOU WORRIED THAT YOUR FOOD WOULD RUN OUT BEFORE YOU GOT MONEY TO BUY MORE.: NEVER TRUE

## 2023-01-16 SDOH — ECONOMIC STABILITY: FOOD INSECURITY: WITHIN THE PAST 12 MONTHS, THE FOOD YOU BOUGHT JUST DIDN'T LAST AND YOU DIDN'T HAVE MONEY TO GET MORE.: NEVER TRUE

## 2023-01-16 ASSESSMENT — PATIENT HEALTH QUESTIONNAIRE - PHQ9
9. THOUGHTS THAT YOU WOULD BE BETTER OFF DEAD, OR OF HURTING YOURSELF: 0
8. MOVING OR SPEAKING SO SLOWLY THAT OTHER PEOPLE COULD HAVE NOTICED. OR THE OPPOSITE, BEING SO FIGETY OR RESTLESS THAT YOU HAVE BEEN MOVING AROUND A LOT MORE THAN USUAL: 0
2. FEELING DOWN, DEPRESSED OR HOPELESS: 0
SUM OF ALL RESPONSES TO PHQ QUESTIONS 1-9: 0
SUM OF ALL RESPONSES TO PHQ9 QUESTIONS 1 & 2: 0
SUM OF ALL RESPONSES TO PHQ QUESTIONS 1-9: 0
3. TROUBLE FALLING OR STAYING ASLEEP: 0
SUM OF ALL RESPONSES TO PHQ QUESTIONS 1-9: 0
7. TROUBLE CONCENTRATING ON THINGS, SUCH AS READING THE NEWSPAPER OR WATCHING TELEVISION: 0
5. POOR APPETITE OR OVEREATING: 0
1. LITTLE INTEREST OR PLEASURE IN DOING THINGS: 0
6. FEELING BAD ABOUT YOURSELF - OR THAT YOU ARE A FAILURE OR HAVE LET YOURSELF OR YOUR FAMILY DOWN: 0
10. IF YOU CHECKED OFF ANY PROBLEMS, HOW DIFFICULT HAVE THESE PROBLEMS MADE IT FOR YOU TO DO YOUR WORK, TAKE CARE OF THINGS AT HOME, OR GET ALONG WITH OTHER PEOPLE: 0
4. FEELING TIRED OR HAVING LITTLE ENERGY: 0
SUM OF ALL RESPONSES TO PHQ QUESTIONS 1-9: 0

## 2023-01-16 ASSESSMENT — SOCIAL DETERMINANTS OF HEALTH (SDOH): HOW HARD IS IT FOR YOU TO PAY FOR THE VERY BASICS LIKE FOOD, HOUSING, MEDICAL CARE, AND HEATING?: NOT HARD AT ALL

## 2023-01-16 NOTE — PROGRESS NOTES
Maciel Stanton (:  1990) is a 34 y.o. male,Established patient, here for evaluation of the following chief complaint(s):  Urinary Tract Infection (Pt is here for a UTI. He states the symptoms started last week. He states is does burn when he urinates and there is a little discharge at times. )         ASSESSMENT/PLAN:  1. Discharge from penis  -     POCT Urinalysis no Micro  -     C.trachomatis N.gonorrhoeae DNA, Urine; Future      Return in about 6 months (around 2023), or if symptoms worsen or fail to improve. Subjective   SUBJECTIVE/OBJECTIVE:  Remedios Merritt is here today for penile pain and \"discharge\". This happened 1 day Saturday morning and has not reoccurred. He is monogamous, STIs are discussed. He states that he had an orgasm the night before this happened, woke up in the morning to urinate and noticed right before he urinated he had pain before urinary stream and \"discharge\". Unable to really elaborate what discharge was. I do question whether or not this was related to the orgasm and not an STI, however UA was performed today and negative for urinary tract infection or blood. I will send UA for chlamydia and gonorrhea testing. I did discuss with Remedios Merritt the possibility of having a blood test for syphilis or HIV AIDS. He does not want this at this time, if he reconsiders he will call the office and I will put these orders in. Since this happened only once I have a low suspicion of STIs however he is informed that if this is positive he will have to return to the office for treatment right away. He agrees to this, denies any further questions at this time. Office will call him with these results. Review of Systems   Constitutional:  Negative for chills, fatigue, fever and unexpected weight change. HENT:  Negative for congestion, ear pain, rhinorrhea, sinus pressure and sinus pain. Eyes:  Negative for discharge, itching and visual disturbance.    Respiratory:  Negative for cough, chest tightness, shortness of breath and wheezing. Cardiovascular:  Negative for chest pain, palpitations and leg swelling. Gastrointestinal:  Negative for abdominal pain, constipation, diarrhea and nausea. Endocrine: Negative for cold intolerance, heat intolerance, polydipsia and polyphagia. Genitourinary:  Negative for dysuria, flank pain and frequency. Musculoskeletal:  Negative for arthralgias, back pain and myalgias. Skin:  Negative for color change. Allergic/Immunologic: Negative for environmental allergies and food allergies. Neurological:  Negative for dizziness, weakness, light-headedness, numbness and headaches. Psychiatric/Behavioral:  Negative for agitation, behavioral problems and confusion. The patient is not nervous/anxious. Objective   Physical Exam  Vitals and nursing note reviewed. Constitutional:       General: He is not in acute distress. Appearance: Normal appearance. He is not ill-appearing. HENT:      Head: Normocephalic and atraumatic. Right Ear: Tympanic membrane and external ear normal.      Left Ear: Tympanic membrane and external ear normal.      Nose: Nose normal. No congestion or rhinorrhea. Mouth/Throat:      Mouth: Mucous membranes are moist.      Pharynx: Oropharynx is clear. No posterior oropharyngeal erythema. Eyes:      Pupils: Pupils are equal, round, and reactive to light. Cardiovascular:      Rate and Rhythm: Normal rate and regular rhythm. Pulses: Normal pulses. Heart sounds: Normal heart sounds. Pulmonary:      Effort: Pulmonary effort is normal.      Breath sounds: Normal breath sounds. No wheezing or rhonchi. Abdominal:      General: Bowel sounds are normal.      Palpations: There is no mass. Tenderness: There is no abdominal tenderness. Musculoskeletal:         General: No swelling or tenderness. Normal range of motion. Cervical back: Normal range of motion. No rigidity or tenderness. Skin:     General: Skin is warm and dry. Capillary Refill: Capillary refill takes less than 2 seconds. Coloration: Skin is not pale. Findings: No erythema. Neurological:      General: No focal deficit present. Mental Status: He is alert and oriented to person, place, and time. Psychiatric:         Mood and Affect: Mood normal.         Behavior: Behavior normal.         Thought Content: Thought content normal.         Judgment: Judgment normal.                An electronic signature was used to authenticate this note.     --Iza Crawford, APRN - CNP

## 2023-01-17 DIAGNOSIS — R36.9 DISCHARGE FROM PENIS: Primary | ICD-10-CM

## 2023-01-17 DIAGNOSIS — Z72.51 HIGH RISK HETEROSEXUAL BEHAVIOR: ICD-10-CM

## 2023-01-17 LAB
C. TRACHOMATIS DNA ,URINE: NEGATIVE
N. GONORRHOEAE DNA, URINE: NEGATIVE
SPECIMEN DESCRIPTION: NORMAL

## 2023-01-17 ASSESSMENT — ENCOUNTER SYMPTOMS
CHEST TIGHTNESS: 0
WHEEZING: 0
ABDOMINAL PAIN: 0
EYE DISCHARGE: 0
CONSTIPATION: 0
BACK PAIN: 0
SHORTNESS OF BREATH: 0
COUGH: 0
SINUS PAIN: 0
RHINORRHEA: 0
COLOR CHANGE: 0
DIARRHEA: 0
SINUS PRESSURE: 0
EYE ITCHING: 0
NAUSEA: 0

## 2023-02-09 ENCOUNTER — TELEPHONE (OUTPATIENT)
Dept: FAMILY MEDICINE CLINIC | Age: 33
End: 2023-02-09

## 2023-02-09 NOTE — TELEPHONE ENCOUNTER
----- Message from [facility]. Lee sent at 2/9/2023  2:13 PM EST -----  Regarding: Need a prescription for Neomycin  Hi,  I tried calling numerous times but got disconnected. I started seeing a naturopath  in hopes of getting my stomach issues under control, as all tests for everything come back fine. I took a test for SIBO and it came back positive. She has requested I contact you for a 2 wk prescription for Neomycin. I'm sure she'd be happy to email you if that's something you need.   Thanks so much,  Adelso Cartwright

## 2023-02-13 ENCOUNTER — TELEPHONE (OUTPATIENT)
Dept: FAMILY MEDICINE CLINIC | Age: 33
End: 2023-02-13

## 2023-02-13 ENCOUNTER — OFFICE VISIT (OUTPATIENT)
Dept: FAMILY MEDICINE CLINIC | Age: 33
End: 2023-02-13
Payer: COMMERCIAL

## 2023-02-13 VITALS
HEIGHT: 74 IN | OXYGEN SATURATION: 97 % | WEIGHT: 197.2 LBS | SYSTOLIC BLOOD PRESSURE: 118 MMHG | BODY MASS INDEX: 25.31 KG/M2 | HEART RATE: 77 BPM | DIASTOLIC BLOOD PRESSURE: 78 MMHG

## 2023-02-13 DIAGNOSIS — K63.89 SMALL INTESTINAL BACTERIAL OVERGROWTH (SIBO): Primary | ICD-10-CM

## 2023-02-13 PROBLEM — K63.8219 SMALL INTESTINAL BACTERIAL OVERGROWTH (SIBO): Status: ACTIVE | Noted: 2023-02-13

## 2023-02-13 PROCEDURE — 99213 OFFICE O/P EST LOW 20 MIN: CPT

## 2023-02-13 RX ORDER — NEOMYCIN SULFATE 500 MG/1
500 TABLET ORAL 2 TIMES DAILY
Qty: 20 TABLET | Refills: 0 | Status: SHIPPED | OUTPATIENT
Start: 2023-02-13 | End: 2023-02-13

## 2023-02-13 RX ORDER — AMOXICILLIN AND CLAVULANATE POTASSIUM 875; 125 MG/1; MG/1
1 TABLET, FILM COATED ORAL 2 TIMES DAILY
Qty: 20 TABLET | Refills: 0 | Status: SHIPPED | OUTPATIENT
Start: 2023-02-13 | End: 2023-02-23

## 2023-02-13 SDOH — ECONOMIC STABILITY: INCOME INSECURITY: HOW HARD IS IT FOR YOU TO PAY FOR THE VERY BASICS LIKE FOOD, HOUSING, MEDICAL CARE, AND HEATING?: NOT HARD AT ALL

## 2023-02-13 SDOH — ECONOMIC STABILITY: HOUSING INSECURITY
IN THE LAST 12 MONTHS, WAS THERE A TIME WHEN YOU DID NOT HAVE A STEADY PLACE TO SLEEP OR SLEPT IN A SHELTER (INCLUDING NOW)?: NO

## 2023-02-13 SDOH — ECONOMIC STABILITY: FOOD INSECURITY: WITHIN THE PAST 12 MONTHS, THE FOOD YOU BOUGHT JUST DIDN'T LAST AND YOU DIDN'T HAVE MONEY TO GET MORE.: NEVER TRUE

## 2023-02-13 SDOH — ECONOMIC STABILITY: FOOD INSECURITY: WITHIN THE PAST 12 MONTHS, YOU WORRIED THAT YOUR FOOD WOULD RUN OUT BEFORE YOU GOT MONEY TO BUY MORE.: NEVER TRUE

## 2023-02-13 NOTE — TELEPHONE ENCOUNTER
[facility] pharmacy called and they stated that the neomycin is on back order. They are wandering if there is any alternative medication that could be sent in for him instead.

## 2023-02-13 NOTE — PROGRESS NOTES
Nabor Curiel (:  1990) is a 34 y.o. male,Established patient, here for evaluation of the following chief complaint(s): Other (Pt is here to show his test results from his breath test. )         ASSESSMENT/PLAN:  1. Small intestinal bacterial overgrowth (SIBO)  -     amoxicillin-clavulanate (AUGMENTIN) 875-125 MG per tablet; Take 1 tablet by mouth 2 times daily for 10 days, Disp-20 tablet, R-0Normal  Test was somewhat conclusive for this however it was not overly productive and methane. GI does recommend using neomycin and  rifaximin. They are able to give him 10 days of rifaximin but he was not able to receive any other antibiotics. Does complain of bloating especially after eating anything, states naturopathic doctor did notice he had B12 deficiency as well or other deficiencies that would be noted with small intestine bacterial overgrowth. States that they are working at bettering these deficiencies with supplementation. Unable to prescribe neomycin due to being on backorder, will supplement with Augmentin twice daily for 10 days. Side effects this medication are discussed. Jack Samson verbalized understanding of this    Return in about 1 year (around 2024), or if symptoms worsen or fail to improve. Subjective   SUBJECTIVE/OBJECTIVE:  Lizbeth Giron is here today for follow-up visit. He does see gastroenterology as well as a naturopathic doctor. Had breath analyzing test to check for small bowel bacterial overgrowth. Review of Systems   Constitutional:  Negative for chills, fatigue, fever and unexpected weight change. HENT:  Negative for congestion, ear pain, rhinorrhea, sinus pressure and sinus pain. Eyes:  Negative for discharge, itching and visual disturbance. Respiratory:  Negative for cough, chest tightness, shortness of breath and wheezing. Cardiovascular:  Negative for chest pain, palpitations and leg swelling.    Gastrointestinal:  Positive for constipation, diarrhea and nausea. Negative for abdominal pain. Bloating especially after eating anything. Endocrine: Negative for cold intolerance, heat intolerance, polydipsia and polyphagia. Genitourinary:  Negative for dysuria, flank pain and frequency. Musculoskeletal:  Negative for arthralgias, back pain and myalgias. Skin:  Negative for color change. Allergic/Immunologic: Negative for environmental allergies and food allergies. Neurological:  Negative for dizziness, weakness, light-headedness, numbness and headaches. Psychiatric/Behavioral:  Negative for agitation, behavioral problems and confusion. The patient is not nervous/anxious. Objective   Physical Exam  Vitals and nursing note reviewed. Constitutional:       General: He is not in acute distress. Appearance: Normal appearance. He is not ill-appearing. HENT:      Head: Normocephalic and atraumatic. Right Ear: Tympanic membrane and external ear normal.      Left Ear: Tympanic membrane and external ear normal.      Nose: Nose normal. No congestion or rhinorrhea. Mouth/Throat:      Mouth: Mucous membranes are moist.      Pharynx: Oropharynx is clear. No posterior oropharyngeal erythema. Eyes:      Pupils: Pupils are equal, round, and reactive to light. Cardiovascular:      Rate and Rhythm: Normal rate and regular rhythm. Pulses: Normal pulses. Heart sounds: Normal heart sounds. Pulmonary:      Effort: Pulmonary effort is normal.      Breath sounds: Normal breath sounds. No wheezing or rhonchi. Abdominal:      General: Bowel sounds are normal.      Palpations: Abdomen is soft. There is no shifting dullness, fluid wave, hepatomegaly, splenomegaly or mass. Tenderness: There is no abdominal tenderness. There is no guarding or rebound. Negative signs include Campoverde's sign, Rovsing's sign and McBurney's sign. Musculoskeletal:         General: No swelling or tenderness. Normal range of motion.       Cervical back: Normal range of motion. No rigidity or tenderness. Skin:     General: Skin is warm and dry. Capillary Refill: Capillary refill takes less than 2 seconds. Coloration: Skin is not pale. Findings: No erythema. Neurological:      General: No focal deficit present. Mental Status: He is alert and oriented to person, place, and time. Psychiatric:         Mood and Affect: Mood normal.         Behavior: Behavior normal.         Thought Content: Thought content normal.         Judgment: Judgment normal.                An electronic signature was used to authenticate this note.     --Babak Nichole, FLAKO - CNP

## 2023-02-13 NOTE — TELEPHONE ENCOUNTER
----- Message from [facility]. Lee sent at 2/9/2023  2:13 PM EST -----  Regarding: Need a prescription for Neomycin  Hi,  I tried calling numerous times but got disconnected. I started seeing a naturopath  in hopes of getting my stomach issues under control, as all tests for everything come back fine. I took a test for SIBO and it came back positive. She has requested I contact you for a 2 wk prescription for Neomycin. I'm sure she'd be happy to email you if that's something you need.   Thanks so much,  [employer]

## 2023-02-13 NOTE — TELEPHONE ENCOUNTER
Talked to kael about the neomycin, he sent in an alternative medication. Called pharmacy back to let them know and called pt and left a VM to call the office back for instructions on the other medication.

## 2023-02-14 ASSESSMENT — ENCOUNTER SYMPTOMS
COUGH: 0
BACK PAIN: 0
EYE ITCHING: 0
SINUS PRESSURE: 0
COLOR CHANGE: 0
DIARRHEA: 1
CONSTIPATION: 1
NAUSEA: 1
ABDOMINAL PAIN: 0
RHINORRHEA: 0
SINUS PAIN: 0
CHEST TIGHTNESS: 0
SHORTNESS OF BREATH: 0
EYE DISCHARGE: 0
WHEEZING: 0

## 2023-03-27 NOTE — ASSESSMENT & PLAN NOTE
Start Buspar 5mg oral tab twice a day. SE discussed, if symptoms of anxiety worsen call office, if become suicidal go to ER.
Uncontrolled, changes made today: add, buspar to help with racing thoughts. Discussed sleep hygen, and other nonhabit forming medications such as melatonin.
declines

## 2023-06-26 RX ORDER — HYDROXYZINE HYDROCHLORIDE 25 MG/1
TABLET, FILM COATED ORAL
Qty: 30 TABLET | Refills: 1 | Status: SHIPPED | OUTPATIENT
Start: 2023-06-26

## 2023-11-29 NOTE — TELEPHONE ENCOUNTER
Lidia Ortiz is requesting a refill on the following medication(s):  Requested Prescriptions     Pending Prescriptions Disp Refills    hydrOXYzine HCl (ATARAX) 25 MG tablet 30 tablet 1     Sig: Take three times a day as needed for anxiety       Last Visit Date (If Applicable):  3/07/2412    Next Visit Date:    Visit date not found

## 2023-11-30 RX ORDER — HYDROXYZINE HYDROCHLORIDE 25 MG/1
TABLET, FILM COATED ORAL
Qty: 30 TABLET | Refills: 1 | Status: SHIPPED | OUTPATIENT
Start: 2023-11-30

## 2024-04-26 NOTE — TELEPHONE ENCOUNTER
Camden Lee is requesting a refill on the following medication(s):  Requested Prescriptions     Pending Prescriptions Disp Refills    hydrOXYzine HCl (ATARAX) 25 MG tablet 30 tablet 1     Sig: Take three times a day as needed for anxiety       Last Visit Date (If Applicable):  2/13/2023    Next Visit Date:    Visit date not found

## 2024-04-27 RX ORDER — HYDROXYZINE HYDROCHLORIDE 25 MG/1
TABLET, FILM COATED ORAL
Qty: 30 TABLET | Refills: 1 | Status: SHIPPED | OUTPATIENT
Start: 2024-04-27

## 2024-07-01 RX ORDER — HYDROXYZINE HYDROCHLORIDE 25 MG/1
TABLET, FILM COATED ORAL
Qty: 30 TABLET | Refills: 1 | Status: SHIPPED | OUTPATIENT
Start: 2024-07-01

## 2024-09-19 RX ORDER — HYDROXYZINE HYDROCHLORIDE 25 MG/1
TABLET, FILM COATED ORAL
Qty: 30 TABLET | Refills: 1 | Status: SHIPPED | OUTPATIENT
Start: 2024-09-19

## 2024-11-12 RX ORDER — HYDROXYZINE HYDROCHLORIDE 25 MG/1
TABLET, FILM COATED ORAL
Qty: 30 TABLET | Refills: 0 | Status: SHIPPED | OUTPATIENT
Start: 2024-11-12

## 2024-11-12 NOTE — TELEPHONE ENCOUNTER
Camden Lee is requesting a refill on the following medication(s):  Requested Prescriptions     Pending Prescriptions Disp Refills    hydrOXYzine HCl (ATARAX) 25 MG tablet 30 tablet 1     Sig: take 1 tablet by mouth three times a day if needed for anxiety       Last Visit Date (If Applicable):  2/13/2023    Next Visit Date:    Visit date not found

## 2024-12-02 ENCOUNTER — PATIENT MESSAGE (OUTPATIENT)
Dept: FAMILY MEDICINE CLINIC | Age: 34
End: 2024-12-02

## 2024-12-02 DIAGNOSIS — R41.89 BRAIN FOG: ICD-10-CM

## 2024-12-02 DIAGNOSIS — K52.9 CHRONIC DIARRHEA OF UNKNOWN ORIGIN: Chronic | ICD-10-CM

## 2024-12-02 DIAGNOSIS — G90.A POTS (POSTURAL ORTHOSTATIC TACHYCARDIA SYNDROME): Primary | Chronic | ICD-10-CM

## 2024-12-02 DIAGNOSIS — R52 BODY ACHES: Chronic | ICD-10-CM

## 2025-01-13 RX ORDER — HYDROXYZINE HYDROCHLORIDE 25 MG/1
TABLET, FILM COATED ORAL
Qty: 30 TABLET | Refills: 0 | Status: SHIPPED | OUTPATIENT
Start: 2025-01-13

## 2025-01-13 NOTE — TELEPHONE ENCOUNTER
Camden Lee is requesting a refill on the following medication(s):  Requested Prescriptions     Pending Prescriptions Disp Refills    hydrOXYzine HCl (ATARAX) 25 MG tablet 30 tablet 0     Sig: take 1 tablet by mouth three times a day if needed for anxiety       Last Visit Date (If Applicable):  2/13/2023    Next Visit Date:    Visit date not found

## 2025-03-18 RX ORDER — HYDROXYZINE HYDROCHLORIDE 25 MG/1
TABLET, FILM COATED ORAL
Qty: 30 TABLET | Refills: 0 | Status: SHIPPED | OUTPATIENT
Start: 2025-03-18

## 2025-03-21 ENCOUNTER — OFFICE VISIT (OUTPATIENT)
Dept: FAMILY MEDICINE CLINIC | Age: 35
End: 2025-03-21
Payer: COMMERCIAL

## 2025-03-21 VITALS
OXYGEN SATURATION: 99 % | TEMPERATURE: 97.4 F | HEART RATE: 73 BPM | WEIGHT: 223.4 LBS | RESPIRATION RATE: 18 BRPM | SYSTOLIC BLOOD PRESSURE: 124 MMHG | BODY MASS INDEX: 28.68 KG/M2 | DIASTOLIC BLOOD PRESSURE: 84 MMHG

## 2025-03-21 DIAGNOSIS — J02.9 SORE THROAT: ICD-10-CM

## 2025-03-21 DIAGNOSIS — J02.0 ACUTE STREPTOCOCCAL PHARYNGITIS: Primary | ICD-10-CM

## 2025-03-21 LAB — S PYO AG THROAT QL: POSITIVE

## 2025-03-21 PROCEDURE — 99213 OFFICE O/P EST LOW 20 MIN: CPT

## 2025-03-21 PROCEDURE — 1036F TOBACCO NON-USER: CPT

## 2025-03-21 PROCEDURE — 87880 STREP A ASSAY W/OPTIC: CPT

## 2025-03-21 PROCEDURE — G8419 CALC BMI OUT NRM PARAM NOF/U: HCPCS

## 2025-03-21 PROCEDURE — G8427 DOCREV CUR MEDS BY ELIG CLIN: HCPCS

## 2025-03-21 PROCEDURE — PBSHW POCT RAPID STREP A

## 2025-03-21 RX ORDER — AMOXICILLIN 500 MG/1
500 CAPSULE ORAL 2 TIMES DAILY
Qty: 20 CAPSULE | Refills: 0 | Status: SHIPPED | OUTPATIENT
Start: 2025-03-21 | End: 2025-03-31

## 2025-03-21 NOTE — PROGRESS NOTES
[facility] department of [place] [id number]  Phone: [phone]  Fax: [phone]    Camden Lee (:  1990) is a 34 y.o. male,Established patient, here for evaluation of the following chief complaint(s):  Pharyngitis (For about 3 days)      Assessment and Plan     Diagnoses and all orders for this visit:  Acute streptococcal pharyngitis  -     amoxicillin (AMOXIL) 500 MG capsule; Take 1 capsule by mouth 2 times daily for 10 days  Sore throat  -     POCT rapid strep A    Bilateral tonsils are exudative and erythematous +2.  POCT rapid strep is positive.  Amoxicillin was sent to pharmacy and patient was instructed on use, administration, and side effects.  Instructed he should have some improvement in the next 48 hours but to finish full course of antibiotics.  Instructed he is contagious for the next 24 hours after starting antibiotics.  Instructed to change toothbrush 24 to 48 hours after starting antibiotics.  Instructed on supportive care and over-the-counter management as needed for symptom relief.  Instructed to ensure he is drinking plenty of fluids.  Instructed to return to office next week if no improvement.  Instructed to go to ER for symptoms of shortness of breath, chest pain, drooling or difficulty swallowing, or syncope.  Patient verbalized understanding to the plan of care.  Return if symptoms worsen or fail to improve.      Discussed exam, POCT findings, plan of care, and follow-up at length with patient and/or their caregiver. Reviewed all prescribed and recommended medications, administration and side effects. All questions were addressed and answered with verbalization of understanding. The patient and/or the caregiver was agreeable with the plan.   Subjective:   Patient is a 34-year-old male who presents to office today for chief complaint of sore throat.  He states symptoms have been ongoing for 3 days.  He denies

## 2025-03-21 NOTE — PATIENT INSTRUCTIONS
-Take full coarse of antibiotics even if you are feeling better. You should have some improvement in the next 48-72 hours  -Change your tooth brush in  hours after being on antibiotics. Practice good cleaning and hand washing.

## 2025-03-26 ASSESSMENT — ENCOUNTER SYMPTOMS
SORE THROAT: 1
SHORTNESS OF BREATH: 0
WHEEZING: 0
DIARRHEA: 0
CONSTIPATION: 0
SWOLLEN GLANDS: 0
VOMITING: 0
ABDOMINAL PAIN: 0
CHANGE IN BOWEL HABIT: 0
VISUAL CHANGE: 0
COUGH: 0
NAUSEA: 0